# Patient Record
Sex: FEMALE | Race: WHITE | Employment: UNEMPLOYED | URBAN - METROPOLITAN AREA
[De-identification: names, ages, dates, MRNs, and addresses within clinical notes are randomized per-mention and may not be internally consistent; named-entity substitution may affect disease eponyms.]

---

## 2017-10-24 ENCOUNTER — TELEPHONE (OUTPATIENT)
Dept: FAMILY MEDICINE CLINIC | Facility: CLINIC | Age: 62
End: 2017-10-24

## 2017-10-24 DIAGNOSIS — Z12.31 VISIT FOR SCREENING MAMMOGRAM: Primary | ICD-10-CM

## 2017-10-24 NOTE — TELEPHONE ENCOUNTER
Pt called in requesting an order for a mammogram.  Pt would like a call back once entered onto her chart. Pt would like to get her mammo done this week.

## 2017-10-31 ENCOUNTER — OFFICE VISIT (OUTPATIENT)
Dept: FAMILY MEDICINE CLINIC | Facility: CLINIC | Age: 62
End: 2017-10-31

## 2017-10-31 VITALS
WEIGHT: 115 LBS | BODY MASS INDEX: 19.16 KG/M2 | DIASTOLIC BLOOD PRESSURE: 84 MMHG | TEMPERATURE: 98 F | HEIGHT: 65 IN | HEART RATE: 68 BPM | SYSTOLIC BLOOD PRESSURE: 132 MMHG

## 2017-10-31 DIAGNOSIS — Z12.11 SCREENING FOR COLON CANCER: ICD-10-CM

## 2017-10-31 DIAGNOSIS — Z78.0 MENOPAUSE: ICD-10-CM

## 2017-10-31 DIAGNOSIS — Z90.722 S/P TAH-BSO (TOTAL ABDOMINAL HYSTERECTOMY AND BILATERAL SALPINGO-OOPHORECTOMY): ICD-10-CM

## 2017-10-31 DIAGNOSIS — Z23 NEED FOR VACCINATION: ICD-10-CM

## 2017-10-31 DIAGNOSIS — Z90.79 S/P TAH-BSO (TOTAL ABDOMINAL HYSTERECTOMY AND BILATERAL SALPINGO-OOPHORECTOMY): ICD-10-CM

## 2017-10-31 DIAGNOSIS — J44.9 CHRONIC OBSTRUCTIVE PULMONARY DISEASE, UNSPECIFIED COPD TYPE (HCC): ICD-10-CM

## 2017-10-31 DIAGNOSIS — Z12.31 VISIT FOR SCREENING MAMMOGRAM: ICD-10-CM

## 2017-10-31 DIAGNOSIS — Z00.00 ADULT GENERAL MEDICAL EXAM: Primary | ICD-10-CM

## 2017-10-31 DIAGNOSIS — Z90.710 S/P TAH-BSO (TOTAL ABDOMINAL HYSTERECTOMY AND BILATERAL SALPINGO-OOPHORECTOMY): ICD-10-CM

## 2017-10-31 PROCEDURE — 99396 PREV VISIT EST AGE 40-64: CPT | Performed by: FAMILY MEDICINE

## 2017-10-31 PROCEDURE — 90686 IIV4 VACC NO PRSV 0.5 ML IM: CPT | Performed by: FAMILY MEDICINE

## 2017-10-31 PROCEDURE — 99212 OFFICE O/P EST SF 10 MIN: CPT | Performed by: FAMILY MEDICINE

## 2017-10-31 PROCEDURE — 90471 IMMUNIZATION ADMIN: CPT | Performed by: FAMILY MEDICINE

## 2017-10-31 NOTE — PROGRESS NOTES
Patient ID: Misa Pritchett is a 65year old female. HPI  Patient presents with:  Physical    She had labs done July 2017 through work at [employer].   She had an LDL of 106, total cholesterol 187, triglycerides 78, HDL 65, and a cholesterol/HDL ratio trouble swallowing. Eyes: Negative for visual disturbance. Respiratory: Negative for cough and shortness of breath. Cardiovascular: Negative for chest pain and palpitations.    Gastrointestinal: Negative for abdominal pain, blood in stool and vomiti mouth. Disp:  Rfl:      Allergies:No Known Allergies   PHYSICAL EXAM:   Physical Exam  Physical Exam   Constitutional: . She appears well-developed and well-nourished. No distress. HENT:   Head: Normocephalic.    Right Ear: Tympanic membrane and ear canal do that along with the mammogram.  Visit for screening mammogram  -     [facility] SCREENING BILAT (CPT=77067);  Future    Need for vaccination  -     IMMUNIZATION ADMINISTRATION  -     EACH ADDITIONAL VACCINE  -     TETANUS, DIPHTHERIA TOXOIDS AND ACELLULAR PERTU

## 2017-11-20 ENCOUNTER — APPOINTMENT (OUTPATIENT)
Dept: LAB | Age: 62
End: 2017-11-20
Attending: FAMILY MEDICINE
Payer: COMMERCIAL

## 2017-11-20 DIAGNOSIS — Z12.11 SCREENING FOR COLON CANCER: ICD-10-CM

## 2017-11-20 PROCEDURE — 82274 ASSAY TEST FOR BLOOD FECAL: CPT

## 2017-11-21 ENCOUNTER — HOSPITAL ENCOUNTER (OUTPATIENT)
Dept: BONE DENSITY | Age: 62
Discharge: HOME | End: 2017-11-21
Attending: FAMILY MEDICINE
Payer: COMMERCIAL

## 2017-11-21 ENCOUNTER — HOSPITAL ENCOUNTER (OUTPATIENT)
Dept: MAMMOGRAPHY | Age: 62
Discharge: HOME | End: 2017-11-21
Attending: FAMILY MEDICINE
Payer: COMMERCIAL

## 2017-11-21 DIAGNOSIS — Z12.31 VISIT FOR SCREENING MAMMOGRAM: ICD-10-CM

## 2017-11-21 DIAGNOSIS — Z78.0 MENOPAUSE: ICD-10-CM

## 2017-11-21 PROCEDURE — 77067 SCR MAMMO BI INCL CAD: CPT | Performed by: FAMILY MEDICINE

## 2017-11-21 PROCEDURE — 77080 DXA BONE DENSITY AXIAL: CPT | Performed by: FAMILY MEDICINE

## 2017-11-22 ENCOUNTER — TELEPHONE (OUTPATIENT)
Dept: FAMILY MEDICINE CLINIC | Facility: CLINIC | Age: 62
End: 2017-11-22

## 2017-11-22 NOTE — TELEPHONE ENCOUNTER
----- Message from Timothy Morgan DO sent at 11/22/2017 11:49 AM CST -----  Nurse, please document and health maintenance that she had this past for colon cancer screening. Your fecal occult blood test was negative.   Hopefully you can get a colonoscopy on

## 2017-11-24 ENCOUNTER — TELEPHONE (OUTPATIENT)
Dept: OTHER | Age: 62
End: 2017-11-24

## 2017-11-24 NOTE — TELEPHONE ENCOUNTER
LMTCB. Transfer to [id number].    ----- Message from Liborio Barrera DO sent at 11/23/2017 11:31 AM CST -----  Your DEXA scan shows osteoporosis. Make sure you stay on calcium and vitamin D and we will send in Fosamax that you can take on a weekly basis.   This

## 2017-11-29 NOTE — TELEPHONE ENCOUNTER
pt was inform of your message below she has 2 questions    1. Can she continue to take her Calcium 500+D3 that she takes, 1 before breakfast and 1 at dinner and can she also take her woman's multivitamins? 2. She is also requesting a note for work

## 2017-12-07 NOTE — TELEPHONE ENCOUNTER
Spoke with patient informed that note is ready and will mail a copy to her home address, verified home address.

## 2017-12-12 NOTE — TELEPHONE ENCOUNTER
Pt stated that she has not received the letter. Pt asked that a send it again. Letter has been resend. Pt does not have a printer.

## 2018-06-04 ENCOUNTER — TELEPHONE (OUTPATIENT)
Dept: FAMILY MEDICINE CLINIC | Facility: CLINIC | Age: 63
End: 2018-06-04

## 2018-06-04 NOTE — TELEPHONE ENCOUNTER
I do not understand why she needs to be seen either. If she is doing well I would to see her in October 2018 which would be her year for physical anyway.

## 2018-07-13 ENCOUNTER — TELEPHONE (OUTPATIENT)
Dept: FAMILY MEDICINE CLINIC | Facility: CLINIC | Age: 63
End: 2018-07-13

## 2018-07-13 DIAGNOSIS — E78.2 MIXED HYPERLIPIDEMIA: Primary | ICD-10-CM

## 2018-07-13 NOTE — TELEPHONE ENCOUNTER
Patient needs Bio Metric screening done, where has BP checked, labs done and glucose, requesting if can have this done in the office with nurse or if will need appointment.        Please advise

## 2018-07-20 NOTE — TELEPHONE ENCOUNTER
Patient has made appointment but needs orders for Glucose and cholesterol testing.        Please advise Patient wants lab draw prior to appointment and stated per insurance this is not a physical

## 2018-07-23 NOTE — TELEPHONE ENCOUNTER
Cholesterol and glucose have been ordered. Do them fasting for 10 hours and then I need to see her back and we can fill out the form together.

## 2018-07-23 NOTE — TELEPHONE ENCOUNTER
We last saw her in October of last year. That is when she usually does her physical.  Is her work requiring a biometric screening at this time instead? If so does she needs a particular set of labs, and does she go to [facility] or our lab?

## 2018-07-23 NOTE — TELEPHONE ENCOUNTER
Orders are for biometric screening, has forms that must be completed with lipids,sugars, ect,  patient must have this done by august 31st.

## 2018-07-24 ENCOUNTER — APPOINTMENT (OUTPATIENT)
Dept: LAB | Age: 63
End: 2018-07-24
Attending: FAMILY MEDICINE
Payer: COMMERCIAL

## 2018-07-24 ENCOUNTER — OFFICE VISIT (OUTPATIENT)
Dept: FAMILY MEDICINE CLINIC | Facility: CLINIC | Age: 63
End: 2018-07-24
Payer: COMMERCIAL

## 2018-07-24 VITALS
SYSTOLIC BLOOD PRESSURE: 114 MMHG | TEMPERATURE: 99 F | HEIGHT: 65 IN | DIASTOLIC BLOOD PRESSURE: 76 MMHG | HEART RATE: 64 BPM | BODY MASS INDEX: 19.66 KG/M2 | WEIGHT: 118 LBS

## 2018-07-24 DIAGNOSIS — E78.2 MIXED HYPERLIPIDEMIA: Primary | ICD-10-CM

## 2018-07-24 DIAGNOSIS — Z00.8 ENCOUNTER FOR BIOMETRIC SCREENING: ICD-10-CM

## 2018-07-24 DIAGNOSIS — Z23 NEED FOR VACCINATION: ICD-10-CM

## 2018-07-24 DIAGNOSIS — E78.2 MIXED HYPERLIPIDEMIA: ICD-10-CM

## 2018-07-24 LAB
CHOLEST SERPL-MCNC: 197 MG/DL (ref 110–200)
GLUCOSE SERPL-MCNC: 88 MG/DL (ref 70–99)
HDLC SERPL-MCNC: 63 MG/DL
LDLC SERPL CALC-MCNC: 117 MG/DL (ref 0–99)
NONHDLC SERPL-MCNC: 134 MG/DL
TRIGL SERPL-MCNC: 86 MG/DL (ref 1–149)

## 2018-07-24 PROCEDURE — 80061 LIPID PANEL: CPT

## 2018-07-24 PROCEDURE — 82947 ASSAY GLUCOSE BLOOD QUANT: CPT

## 2018-07-24 PROCEDURE — 90715 TDAP VACCINE 7 YRS/> IM: CPT | Performed by: FAMILY MEDICINE

## 2018-07-24 PROCEDURE — 99212 OFFICE O/P EST SF 10 MIN: CPT | Performed by: FAMILY MEDICINE

## 2018-07-24 PROCEDURE — 36415 COLL VENOUS BLD VENIPUNCTURE: CPT

## 2018-07-24 PROCEDURE — 99214 OFFICE O/P EST MOD 30 MIN: CPT | Performed by: FAMILY MEDICINE

## 2018-07-24 PROCEDURE — 90471 IMMUNIZATION ADMIN: CPT | Performed by: FAMILY MEDICINE

## 2018-07-24 NOTE — PROGRESS NOTES
Patient ID: Vishal Ortega is a 59year old female. HPI  Patient presents with:  Forms Completion: biometric form filled out not a Px    She fasted for 12 hours and needs labs for a biometric screening. She works at [employer].   They do need a cholestero results found for: Alfonso Rust, CREAURINE, MIALBURINE, MCRRATIOUR, MALBCRECALC, MICROALBUMIN, CREAUR, MALBCREACALC    No results found for: CHOLEST, TRIG, HDL, LDL, VLDL, TCHDLRATIO, NONHDLC, CHOLHDLRATIO, NONHDLC, CALCNONHDL    TSH (S) (uIU/mL)   Date Va Sennosides-Docusate Sodium (SENNA S) 8.6-50 MG Oral Tab Take  by mouth.  Disp:  Rfl:      Allergies:No Known Allergies   PHYSICAL EXAM:   Physical Exam  Blood pressure 114/76, pulse 64, temperature 98.7 °F (37.1 °C), temperature source Oral, height 5' 5\"

## 2018-09-11 ENCOUNTER — TELEPHONE (OUTPATIENT)
Dept: FAMILY MEDICINE CLINIC | Facility: CLINIC | Age: 63
End: 2018-09-11

## 2018-09-11 NOTE — TELEPHONE ENCOUNTER
Dr Lelia Ricketts, please advise. Patient stated that she has been on the alendronate now almost a year, and has had symptoms with taking this medicine: headaches, arm and leg bones sore, nauseated, dizziness.  She stated she didn't have these symptoms before ta

## 2018-09-11 NOTE — TELEPHONE ENCOUNTER
Stop taking the medication and see how you feel. If you can definitely feel better without the aches and pains that would be wonderful but if you continue to have the aches and pains I definitely want to see you.   After being off the medication for 2 or 3

## 2018-11-16 NOTE — TELEPHONE ENCOUNTER
Diabetes/Glucose Control    • Glucose maintained within prescribed range Progressing        Impaired Communication    • Patient will achieve maximal communication potential Progressing        Impaired Functional Mobility    • Achieve highest/safest level o Yes, she can do that with regard to the calcium taking the supplement twice daily along with a multivitamin. And because she does have osteopenia we will go ahead and write down a letter for work.

## 2020-02-18 ENCOUNTER — OFFICE VISIT (OUTPATIENT)
Dept: FAMILY MEDICINE CLINIC | Facility: CLINIC | Age: 65
End: 2020-02-18
Payer: COMMERCIAL

## 2020-02-18 VITALS
HEIGHT: 65 IN | RESPIRATION RATE: 17 BRPM | TEMPERATURE: 99 F | HEART RATE: 70 BPM | DIASTOLIC BLOOD PRESSURE: 81 MMHG | BODY MASS INDEX: 18.83 KG/M2 | WEIGHT: 113 LBS | SYSTOLIC BLOOD PRESSURE: 146 MMHG

## 2020-02-18 DIAGNOSIS — I83.90 SPIDER VEIN OF LOWER EXTREMITY: ICD-10-CM

## 2020-02-18 DIAGNOSIS — R29.3 POOR POSTURE: ICD-10-CM

## 2020-02-18 DIAGNOSIS — K08.9 POOR DENTITION: ICD-10-CM

## 2020-02-18 DIAGNOSIS — J44.9 CHRONIC OBSTRUCTIVE PULMONARY DISEASE, UNSPECIFIED COPD TYPE (HCC): ICD-10-CM

## 2020-02-18 DIAGNOSIS — Z00.00 ADULT GENERAL MEDICAL EXAM: Primary | ICD-10-CM

## 2020-02-18 DIAGNOSIS — E78.2 MIXED HYPERLIPIDEMIA: ICD-10-CM

## 2020-02-18 DIAGNOSIS — M81.0 AGE-RELATED OSTEOPOROSIS WITHOUT CURRENT PATHOLOGICAL FRACTURE: ICD-10-CM

## 2020-02-18 DIAGNOSIS — Z12.11 SCREENING FOR COLON CANCER: ICD-10-CM

## 2020-02-18 DIAGNOSIS — L98.9 SKIN LESION OF LEFT LEG: ICD-10-CM

## 2020-02-18 DIAGNOSIS — L60.8 DISCOLORATION AND THICKENING OF NAILS BOTH FEET: ICD-10-CM

## 2020-02-18 DIAGNOSIS — R03.0 ELEVATED BLOOD PRESSURE READING: ICD-10-CM

## 2020-02-18 DIAGNOSIS — Z12.31 VISIT FOR SCREENING MAMMOGRAM: ICD-10-CM

## 2020-02-18 DIAGNOSIS — L82.1 SK (SEBORRHEIC KERATOSIS): ICD-10-CM

## 2020-02-18 DIAGNOSIS — Z12.4 CERVICAL CANCER SCREENING: ICD-10-CM

## 2020-02-18 DIAGNOSIS — L21.9 SEBORRHEIC DERMATITIS OF SCALP: ICD-10-CM

## 2020-02-18 PROCEDURE — 99396 PREV VISIT EST AGE 40-64: CPT | Performed by: FAMILY MEDICINE

## 2020-02-18 PROCEDURE — 99214 OFFICE O/P EST MOD 30 MIN: CPT | Performed by: FAMILY MEDICINE

## 2020-02-18 RX ORDER — KETOCONAZOLE 20 MG/ML
SHAMPOO TOPICAL
Qty: 120 ML | Refills: 2 | Status: SHIPPED | OUTPATIENT
Start: 2020-02-18

## 2020-02-18 NOTE — PROGRESS NOTES
Patient ID: Marija Denny is a 62year old female. HPI  Patient presents with:  Physical    Pt does not smoke. She works at [employer] in [employer]. States she is  in name only. She lives in the same house as her .  She rarely drinks alcohol spend a whole afternoon in the bathroom emptying her bowels to prep for colonoscopy. She would be willing to do a fit test.    States her teeth are in bad shape. She has not seen a dentist for a long time. She has bleeding gums and missing teeth.      She OSMOCALC 290 09/23/2013    AST 25 09/23/2013    ALT 17 09/23/2013    ALKPHOS 60 09/23/2013    BILT 0.6 09/23/2013    TP 7.3 09/23/2013    ALB 3.8 09/23/2013    GLOBULIN 3.5 09/23/2013    AGRATIO 1.1 09/23/2013     09/23/2013    K 4.0 09/23/2013    CL Positive for abdominal pain. Negative for blood in stool. Genitourinary: Negative for hematuria. Skin: Positive for rash (scalp, back, legs). Negative for color change. Allergic/Immunologic: Negative for environmental allergies.    Neurological: Negat Attends meetings of clubs or organizations: Not on file        Relationship status: Not on file      Intimate partner violence:        Fear of current or ex partner: Not on file        Emotionally abused: Not on file        Physically abused: Not on file normal. There is no hepatosplenomegaly. There is no tenderness. Lymphadenopathy: She has no cervical adenopathy. Lower legs: No edema of the legs bilaterally. Good PT pulses. Neurological: She is alert and oriented to person, place, and time.  She has (CPT=77080); Future    Cervical cancer screening  -     OBG - INTERNAL    Screening for colon cancer  -     OCCULT BLOOD, FECAL, IMMUNOASSAY;  Future    Elevated blood pressure reading  -     URINALYSIS WITH CULTURE REFLEX; Future    Poor dentition  See Guanakito Catalan prepared under the direction and in the presence of Ramesh Moss DO. Electronically Signed: Nessa Howell, 2/18/2020, 11:12 AM.    IRamesh DO,  personally performed the services described in this documentation.  All medical record entries ma

## 2020-03-02 ENCOUNTER — OFFICE VISIT (OUTPATIENT)
Dept: PODIATRY CLINIC | Facility: CLINIC | Age: 65
End: 2020-03-02
Payer: COMMERCIAL

## 2020-03-02 DIAGNOSIS — L60.3 NAIL DYSTROPHY: Primary | ICD-10-CM

## 2020-03-02 PROCEDURE — 99202 OFFICE O/P NEW SF 15 MIN: CPT | Performed by: PODIATRIST

## 2020-03-02 PROCEDURE — 99212 OFFICE O/P EST SF 10 MIN: CPT | Performed by: PODIATRIST

## 2020-03-02 NOTE — PROGRESS NOTES
HPI:    Patient ID: Trinh Stevens is a 62year old female. This pleasant 29-year-old female presents as a new patient to me on consult from [address]. Patient's concern is changes associated with both of her great toenails.   She is noticed a change o instructed to follow-up at this point I recommend no treatment but be patient and wait as the nail grows out. I discussed appropriate clipping filing and brushing of this nail.   She is well-informed and will follow-up as needed         ASSESSMENT/PLAN:

## 2020-03-03 ENCOUNTER — HOSPITAL ENCOUNTER (OUTPATIENT)
Dept: GENERAL RADIOLOGY | Age: 65
Discharge: HOME | End: 2020-03-03
Attending: FAMILY MEDICINE
Payer: COMMERCIAL

## 2020-03-03 ENCOUNTER — LAB ENCOUNTER (OUTPATIENT)
Dept: LAB | Age: 65
End: 2020-03-03
Attending: FAMILY MEDICINE
Payer: COMMERCIAL

## 2020-03-03 ENCOUNTER — APPOINTMENT (OUTPATIENT)
Dept: LAB | Age: 65
End: 2020-03-03
Attending: FAMILY MEDICINE
Payer: COMMERCIAL

## 2020-03-03 DIAGNOSIS — E78.2 MIXED HYPERLIPIDEMIA: ICD-10-CM

## 2020-03-03 DIAGNOSIS — R03.0 ELEVATED BLOOD PRESSURE READING: ICD-10-CM

## 2020-03-03 DIAGNOSIS — Z00.00 ADULT GENERAL MEDICAL EXAM: ICD-10-CM

## 2020-03-03 DIAGNOSIS — J44.9 CHRONIC OBSTRUCTIVE PULMONARY DISEASE, UNSPECIFIED COPD TYPE (HCC): ICD-10-CM

## 2020-03-03 LAB
ALBUMIN SERPL-MCNC: 3.9 G/DL (ref 3.4–5)
ALBUMIN/GLOB SERPL: 1 {RATIO} (ref 1–2)
ALP LIVER SERPL-CCNC: 69 U/L (ref 50–130)
ALT SERPL-CCNC: 20 U/L (ref 13–56)
ANION GAP SERPL CALC-SCNC: 5 MMOL/L (ref 0–18)
AST SERPL-CCNC: 19 U/L (ref 15–37)
BASOPHILS # BLD AUTO: 0.06 X10(3) UL (ref 0–0.2)
BASOPHILS NFR BLD AUTO: 0.9 %
BILIRUB SERPL-MCNC: 0.5 MG/DL (ref 0.1–2)
BILIRUB UR QL: NEGATIVE
BUN BLD-MCNC: 28 MG/DL (ref 7–18)
BUN/CREAT SERPL: 21.9 (ref 10–20)
CALCIUM BLD-MCNC: 9.4 MG/DL (ref 8.5–10.1)
CHLORIDE SERPL-SCNC: 103 MMOL/L (ref 98–112)
CHOLEST SMN-MCNC: 181 MG/DL (ref ?–200)
CLARITY UR: CLEAR
CO2 SERPL-SCNC: 28 MMOL/L (ref 21–32)
COLOR UR: YELLOW
CREAT BLD-MCNC: 1.28 MG/DL (ref 0.55–1.02)
DEPRECATED RDW RBC AUTO: 43.5 FL (ref 35.1–46.3)
EOSINOPHIL # BLD AUTO: 0.13 X10(3) UL (ref 0–0.7)
EOSINOPHIL NFR BLD AUTO: 1.8 %
ERYTHROCYTE [DISTWIDTH] IN BLOOD BY AUTOMATED COUNT: 12.5 % (ref 11–15)
GLOBULIN PLAS-MCNC: 3.9 G/DL (ref 2.8–4.4)
GLUCOSE BLD-MCNC: 85 MG/DL (ref 70–99)
GLUCOSE UR-MCNC: NEGATIVE MG/DL
HCT VFR BLD AUTO: 45.2 % (ref 35–48)
HDLC SERPL-MCNC: 61 MG/DL (ref 40–59)
HGB BLD-MCNC: 14.2 G/DL (ref 12–16)
HGB UR QL STRIP.AUTO: NEGATIVE
IMM GRANULOCYTES # BLD AUTO: 0.02 X10(3) UL (ref 0–1)
IMM GRANULOCYTES NFR BLD: 0.3 %
KETONES UR-MCNC: NEGATIVE MG/DL
LDLC SERPL CALC-MCNC: 104 MG/DL (ref ?–100)
LEUKOCYTE ESTERASE UR QL STRIP.AUTO: NEGATIVE
LYMPHOCYTES # BLD AUTO: 1.16 X10(3) UL (ref 1–4)
LYMPHOCYTES NFR BLD AUTO: 16.5 %
M PROTEIN MFR SERPL ELPH: 7.8 G/DL (ref 6.4–8.2)
MCH RBC QN AUTO: 29.8 PG (ref 26–34)
MCHC RBC AUTO-ENTMCNC: 31.4 G/DL (ref 31–37)
MCV RBC AUTO: 94.8 FL (ref 80–100)
MONOCYTES # BLD AUTO: 0.45 X10(3) UL (ref 0.1–1)
MONOCYTES NFR BLD AUTO: 6.4 %
NEUTROPHILS # BLD AUTO: 5.21 X10 (3) UL (ref 1.5–7.7)
NEUTROPHILS # BLD AUTO: 5.21 X10(3) UL (ref 1.5–7.7)
NEUTROPHILS NFR BLD AUTO: 74.1 %
NITRITE UR QL STRIP.AUTO: NEGATIVE
NONHDLC SERPL-MCNC: 120 MG/DL (ref ?–130)
OSMOLALITY SERPL CALC.SUM OF ELEC: 287 MOSM/KG (ref 275–295)
PATIENT FASTING Y/N/NP: YES
PATIENT FASTING Y/N/NP: YES
PH UR: 7 [PH] (ref 5–8)
PLATELET # BLD AUTO: 269 10(3)UL (ref 150–450)
POTASSIUM SERPL-SCNC: 4.1 MMOL/L (ref 3.5–5.1)
PROT UR-MCNC: NEGATIVE MG/DL
RBC # BLD AUTO: 4.77 X10(6)UL (ref 3.8–5.3)
SODIUM SERPL-SCNC: 136 MMOL/L (ref 136–145)
SP GR UR STRIP: 1.02 (ref 1–1.03)
TRIGL SERPL-MCNC: 80 MG/DL (ref 30–149)
TSI SER-ACNC: 2.06 MIU/ML (ref 0.36–3.74)
UROBILINOGEN UR STRIP-ACNC: <2
VLDLC SERPL CALC-MCNC: 16 MG/DL (ref 0–30)
WBC # BLD AUTO: 7 X10(3) UL (ref 4–11)

## 2020-03-03 PROCEDURE — 85025 COMPLETE CBC W/AUTO DIFF WBC: CPT

## 2020-03-03 PROCEDURE — 36415 COLL VENOUS BLD VENIPUNCTURE: CPT

## 2020-03-03 PROCEDURE — 71046 X-RAY EXAM CHEST 2 VIEWS: CPT | Performed by: FAMILY MEDICINE

## 2020-03-03 PROCEDURE — 82306 VITAMIN D 25 HYDROXY: CPT

## 2020-03-03 PROCEDURE — 80061 LIPID PANEL: CPT

## 2020-03-03 PROCEDURE — 80053 COMPREHEN METABOLIC PANEL: CPT

## 2020-03-03 PROCEDURE — 81003 URINALYSIS AUTO W/O SCOPE: CPT

## 2020-03-03 PROCEDURE — 93010 ELECTROCARDIOGRAM REPORT: CPT | Performed by: FAMILY MEDICINE

## 2020-03-03 PROCEDURE — 93005 ELECTROCARDIOGRAM TRACING: CPT

## 2020-03-03 PROCEDURE — 84443 ASSAY THYROID STIM HORMONE: CPT

## 2020-03-04 LAB — 25(OH)D3 SERPL-MCNC: 69.6 NG/ML (ref 30–100)

## 2020-03-09 ENCOUNTER — APPOINTMENT (OUTPATIENT)
Dept: LAB | Age: 65
End: 2020-03-09
Attending: FAMILY MEDICINE
Payer: COMMERCIAL

## 2020-03-09 DIAGNOSIS — Z12.11 SCREENING FOR COLON CANCER: ICD-10-CM

## 2020-03-09 PROCEDURE — 82274 ASSAY TEST FOR BLOOD FECAL: CPT

## 2020-03-10 ENCOUNTER — APPOINTMENT (OUTPATIENT)
Dept: LAB | Age: 65
End: 2020-03-10
Attending: FAMILY MEDICINE
Payer: COMMERCIAL

## 2020-03-10 DIAGNOSIS — N28.9 RENAL INSUFFICIENCY: ICD-10-CM

## 2020-03-10 LAB
ANION GAP SERPL CALC-SCNC: 7 MMOL/L (ref 0–18)
BUN BLD-MCNC: 20 MG/DL (ref 7–18)
BUN/CREAT SERPL: 21.5 (ref 10–20)
CALCIUM BLD-MCNC: 10.4 MG/DL (ref 8.5–10.1)
CHLORIDE SERPL-SCNC: 103 MMOL/L (ref 98–112)
CO2 SERPL-SCNC: 29 MMOL/L (ref 21–32)
CREAT BLD-MCNC: 0.93 MG/DL (ref 0.55–1.02)
GLUCOSE BLD-MCNC: 87 MG/DL (ref 70–99)
HEMOCCULT STL QL: NEGATIVE
OSMOLALITY SERPL CALC.SUM OF ELEC: 290 MOSM/KG (ref 275–295)
PATIENT FASTING Y/N/NP: NO
POTASSIUM SERPL-SCNC: 4.1 MMOL/L (ref 3.5–5.1)
SODIUM SERPL-SCNC: 139 MMOL/L (ref 136–145)

## 2020-03-10 PROCEDURE — 36415 COLL VENOUS BLD VENIPUNCTURE: CPT

## 2020-03-10 PROCEDURE — 80048 BASIC METABOLIC PNL TOTAL CA: CPT

## 2020-03-16 ENCOUNTER — HOSPITAL ENCOUNTER (OUTPATIENT)
Dept: MAMMOGRAPHY | Age: 65
Discharge: HOME | End: 2020-03-16
Attending: FAMILY MEDICINE
Payer: COMMERCIAL

## 2020-03-16 DIAGNOSIS — Z12.31 VISIT FOR SCREENING MAMMOGRAM: ICD-10-CM

## 2020-03-16 PROCEDURE — 77063 BREAST TOMOSYNTHESIS BI: CPT | Performed by: FAMILY MEDICINE

## 2020-03-16 PROCEDURE — 77067 SCR MAMMO BI INCL CAD: CPT | Performed by: FAMILY MEDICINE

## 2020-06-09 ENCOUNTER — TELEPHONE (OUTPATIENT)
Dept: FAMILY MEDICINE CLINIC | Facility: CLINIC | Age: 65
End: 2020-06-09

## 2020-06-09 NOTE — TELEPHONE ENCOUNTER
Per patient she has an appointment on 06/16/2020 for her Dexa scan and need to know if any of her medication need to stop? Please advise.

## 2020-06-16 ENCOUNTER — HOSPITAL ENCOUNTER (OUTPATIENT)
Dept: BONE DENSITY | Age: 65
Discharge: HOME | End: 2020-06-16
Attending: FAMILY MEDICINE
Payer: COMMERCIAL

## 2020-06-16 DIAGNOSIS — M81.0 AGE-RELATED OSTEOPOROSIS WITHOUT CURRENT PATHOLOGICAL FRACTURE: ICD-10-CM

## 2020-06-16 PROCEDURE — 77080 DXA BONE DENSITY AXIAL: CPT | Performed by: FAMILY MEDICINE

## 2020-07-10 ENCOUNTER — VIRTUAL PHONE E/M (OUTPATIENT)
Dept: FAMILY MEDICINE CLINIC | Facility: CLINIC | Age: 65
End: 2020-07-10
Payer: COMMERCIAL

## 2020-07-10 DIAGNOSIS — M81.0 AGE-RELATED OSTEOPOROSIS WITHOUT CURRENT PATHOLOGICAL FRACTURE: Primary | ICD-10-CM

## 2020-07-10 PROCEDURE — 99213 OFFICE O/P EST LOW 20 MIN: CPT | Performed by: FAMILY MEDICINE

## 2020-07-10 RX ORDER — RISEDRONATE SODIUM 150 MG/1
150 TABLET, FILM COATED ORAL
Qty: 3 TABLET | Refills: 0 | Status: SHIPPED | OUTPATIENT
Start: 2020-07-10 | End: 2020-08-13

## 2020-07-10 NOTE — PROGRESS NOTES
TELEPHONE VISIT PROGRESS NOTE  Todays date: 7/10/2020 1:49 PM      Due to the COVID-19 emergency implementation plan, this patient's incoming call was converted to a telephone visit as agreed upon with the patient.     Virtual/Telephone Check-In    Vincent Marshall discussed her options for plan of care with the pt. She has not tried [place] or Actonel. I reinforced the importance of osteoporosis management. She takes a colon and bone supplement from [employer] twice per day.  She states she tries to be very cautious with unchanged and the AP lumbar has gone from 0.835 to 0.831 a loss of 0.5 %. 10 year Fracture Risk:      Major Osteoporotic Fracture:  12 %. Hip Fracture:  2.8 %.       Physical Exam:   Limited examination due to this being a telephone visit       Pa Leukemia   • Heart Disease Paternal Uncle         CAD       Reviewed Social History:  Social History    Tobacco Use      Smoking status: Former Smoker        Types: Cigarettes      Smokeless tobacco: Former User    Alcohol use:  Yes      Alcohol/week: 0.0 s

## 2020-08-13 ENCOUNTER — OFFICE VISIT (OUTPATIENT)
Dept: OBGYN CLINIC | Facility: CLINIC | Age: 65
End: 2020-08-13

## 2020-08-13 VITALS
DIASTOLIC BLOOD PRESSURE: 79 MMHG | HEART RATE: 77 BPM | BODY MASS INDEX: 19 KG/M2 | WEIGHT: 111.63 LBS | SYSTOLIC BLOOD PRESSURE: 121 MMHG

## 2020-08-13 DIAGNOSIS — Z01.419 ENCOUNTER FOR GYNECOLOGICAL EXAMINATION WITHOUT ABNORMAL FINDING: Primary | ICD-10-CM

## 2020-08-13 PROCEDURE — 99396 PREV VISIT EST AGE 40-64: CPT | Performed by: OBSTETRICS & GYNECOLOGY

## 2020-08-13 PROCEDURE — 3074F SYST BP LT 130 MM HG: CPT | Performed by: OBSTETRICS & GYNECOLOGY

## 2020-08-13 PROCEDURE — 3078F DIAST BP <80 MM HG: CPT | Performed by: OBSTETRICS & GYNECOLOGY

## 2020-08-16 PROBLEM — Z90.710 HISTORY OF TOTAL VAGINAL HYSTERECTOMY: Status: ACTIVE | Noted: 2017-10-31

## 2020-08-16 NOTE — PROGRESS NOTES
Jem Carmona is a 60year old female  No LMP recorded. Patient has had a hysterectomy. Patient presents with:  Gyn Exam: Annual exam -- last seen in . Hx TVH/BSO in past. Just lost job at [employer] (worked there 40 yrs)  .     OBSTETRICS HISTORY: per session: Not on file      Stress: Not on file    Relationships      Social connections:        Talks on phone: Not on file        Gets together: Not on file        Attends Denominational service: Not on file        Active member of club or organization: Not denies chest pain or palpitations  Respiratory:    denies shortness of breath  Gastrointestinal:  denies severe abdominal pain, frequent diarrhea or constipation, nausea / vomiting  Genitourinary:    denies dysuria, bothersome incontinence  Skin/Breast:

## 2020-08-20 ENCOUNTER — NURSE TRIAGE (OUTPATIENT)
Dept: FAMILY MEDICINE CLINIC | Facility: CLINIC | Age: 65
End: 2020-08-20

## 2020-08-20 NOTE — TELEPHONE ENCOUNTER
Action Requested: Summary for Provider     []  Critical Lab, Recommendations Needed  [] Need Additional Advice  []   FYI    []   Need Orders  [] Need Medications Sent to Pharmacy  []  Other     SUMMARY:    An appointment was offered and refused.   She state

## 2020-08-21 RX ORDER — SULFAMETHOXAZOLE AND TRIMETHOPRIM 800; 160 MG/1; MG/1
1 TABLET ORAL 2 TIMES DAILY
Qty: 10 TABLET | Refills: 0 | Status: SHIPPED | OUTPATIENT
Start: 2020-08-21 | End: 2020-08-26

## 2020-08-21 NOTE — TELEPHONE ENCOUNTER
I will go ahead and do this for her this time but next time she would still need to be seen even without insurance or go to the immediate care. .  I sent in Bactrim twice daily for 5 days.

## 2020-08-21 NOTE — TELEPHONE ENCOUNTER
Left message to pt to call back. Also Vengahart message with MD recommendation sent to pt. No future appointments.

## 2020-08-21 NOTE — TELEPHONE ENCOUNTER
Pt calling to follow up. Reviewed doctor's instructions as noted below. Pt agreed with plan of care, had no further questions at this time.

## 2020-08-22 ENCOUNTER — TELEPHONE (OUTPATIENT)
Dept: FAMILY MEDICINE CLINIC | Facility: CLINIC | Age: 65
End: 2020-08-22

## 2020-08-22 NOTE — TELEPHONE ENCOUNTER
On call note: Was called on 8/22/20 by patient that she has been taking bactrim for UTI symptoms. Those symptoms have resolved. No hematuria or urgency. Pt now has had some loss of appetite and headaches. Thinks this is from the antibiotics.  Pt denies any

## 2020-08-24 ENCOUNTER — TELEPHONE (OUTPATIENT)
Dept: FAMILY MEDICINE CLINIC | Facility: CLINIC | Age: 65
End: 2020-08-24

## 2020-08-24 NOTE — TELEPHONE ENCOUNTER
Pt states on Friday, 8/21/2020, she was given [facility] prescription for symptoms of a urinary tract infection. Pt states she had taken three doses of the medication and felt she was having side effects from that med.  She talked to the on-call doctor about

## 2020-08-25 ENCOUNTER — HOSPITAL ENCOUNTER (OUTPATIENT)
Age: 65
Discharge: HOME | End: 2020-08-25
Attending: EMERGENCY MEDICINE

## 2020-08-25 VITALS
SYSTOLIC BLOOD PRESSURE: 129 MMHG | DIASTOLIC BLOOD PRESSURE: 98 MMHG | WEIGHT: 111 LBS | HEIGHT: 64 IN | TEMPERATURE: 98 F | HEART RATE: 79 BPM | BODY MASS INDEX: 18.95 KG/M2 | OXYGEN SATURATION: 99 % | RESPIRATION RATE: 16 BRPM

## 2020-08-25 DIAGNOSIS — R10.9 ABDOMINAL PAIN, ACUTE: ICD-10-CM

## 2020-08-25 DIAGNOSIS — Z87.440 HX: UTI (URINARY TRACT INFECTION): ICD-10-CM

## 2020-08-25 DIAGNOSIS — B34.9 VIRAL SYNDROME: Primary | ICD-10-CM

## 2020-08-25 DIAGNOSIS — Z20.822 ENCOUNTER FOR LABORATORY TESTING FOR COVID-19 VIRUS: ICD-10-CM

## 2020-08-25 PROCEDURE — 87086 URINE CULTURE/COLONY COUNT: CPT | Performed by: EMERGENCY MEDICINE

## 2020-08-25 PROCEDURE — 81002 URINALYSIS NONAUTO W/O SCOPE: CPT | Performed by: EMERGENCY MEDICINE

## 2020-08-25 PROCEDURE — 99214 OFFICE O/P EST MOD 30 MIN: CPT | Performed by: EMERGENCY MEDICINE

## 2020-08-25 PROCEDURE — U0003 INFECTIOUS AGENT DETECTION BY NUCLEIC ACID (DNA OR RNA); SEVERE ACUTE RESPIRATORY SYNDROME CORONAVIRUS 2 (SARS-COV-2) (CORONAVIRUS DISEASE [COVID-19]), AMPLIFIED PROBE TECHNIQUE, MAKING USE OF HIGH THROUGHPUT TECHNOLOGIES AS DESCRIBED BY CMS-2020-01-R: HCPCS | Performed by: EMERGENCY MEDICINE

## 2020-08-25 NOTE — ED INITIAL ASSESSMENT (HPI)
C/o nausea no vomiting headache loss of appetite feeling tired  Denies fever  Was started on Bactrim 4 days ago  Pt also c/o abd pain

## 2020-08-25 NOTE — TELEPHONE ENCOUNTER
I agree with triage advice given. If her symptoms continue like this is going to need to go to the immediate care or emergency room. Noris Brown

## 2020-08-25 NOTE — ED PROVIDER NOTES
Patient Seen in: [facility] In [address] [place] [address]      History   Patient presents with:  Nausea/Vomiting/Diarrhea  Headache    Stated Complaint: nausea, headaches, low appetite    HPI  Patient is here with her son.   Patient has a variety of c signs reviewed. All other systems reviewed and negative except as noted above.     Physical Exam     ED Triage Vitals [08/25/20 1011]   BP (!) 129/98   Pulse 79   Resp 16   Temp 97.7 °F (36.5 °C)   Temp src Temporal   SpO2 99 %   O2 Device None (Room a [facility] POCT URINALYSIS DIPSTICK - Abnormal; Notable for the following components:       Result Value    Ketone, Urine 15  (*)     All other components within normal limits   SARS-COV-2 BY PCR ()   URINE CULTURE, ROUTINE       MDM   Patient is most conc

## 2021-04-05 ENCOUNTER — IMMUNIZATION (OUTPATIENT)
Dept: LAB | Age: 66
End: 2021-04-05
Attending: HOSPITALIST
Payer: COMMERCIAL

## 2021-04-05 DIAGNOSIS — Z23 NEED FOR VACCINATION: Primary | ICD-10-CM

## 2021-04-05 PROCEDURE — 0001A SARSCOV2 VAC 30MCG/0.3ML IM: CPT

## 2021-04-08 ENCOUNTER — NURSE TRIAGE (OUTPATIENT)
Dept: FAMILY MEDICINE CLINIC | Facility: CLINIC | Age: 66
End: 2021-04-08

## 2021-04-08 ENCOUNTER — OFFICE VISIT (OUTPATIENT)
Dept: FAMILY MEDICINE CLINIC | Facility: CLINIC | Age: 66
End: 2021-04-08

## 2021-04-08 VITALS
HEART RATE: 73 BPM | SYSTOLIC BLOOD PRESSURE: 118 MMHG | HEIGHT: 64 IN | WEIGHT: 110 LBS | DIASTOLIC BLOOD PRESSURE: 80 MMHG | BODY MASS INDEX: 18.78 KG/M2

## 2021-04-08 DIAGNOSIS — R51.9 HEADACHE DISORDER: Primary | ICD-10-CM

## 2021-04-08 PROCEDURE — 3079F DIAST BP 80-89 MM HG: CPT | Performed by: NURSE PRACTITIONER

## 2021-04-08 PROCEDURE — 99214 OFFICE O/P EST MOD 30 MIN: CPT | Performed by: NURSE PRACTITIONER

## 2021-04-08 PROCEDURE — 3074F SYST BP LT 130 MM HG: CPT | Performed by: NURSE PRACTITIONER

## 2021-04-08 PROCEDURE — 3008F BODY MASS INDEX DOCD: CPT | Performed by: NURSE PRACTITIONER

## 2021-04-08 RX ORDER — SUMATRIPTAN 25 MG/1
TABLET, FILM COATED ORAL
Qty: 9 TABLET | Refills: 0 | Status: SHIPPED | OUTPATIENT
Start: 2021-04-08

## 2021-04-08 RX ORDER — OMEPRAZOLE 20 MG/1
20 CAPSULE, DELAYED RELEASE ORAL
Qty: 30 CAPSULE | Refills: 0 | Status: SHIPPED | OUTPATIENT
Start: 2021-04-08

## 2021-04-08 RX ORDER — NAPROXEN 500 MG/1
500 TABLET ORAL 2 TIMES DAILY PRN
Qty: 60 TABLET | Refills: 0 | Status: SHIPPED | OUTPATIENT
Start: 2021-04-08

## 2021-04-08 NOTE — TELEPHONE ENCOUNTER
Reason for Disposition  • Unexplained headache that is present > 24 hours    Protocols used: HEADACHE-A-OH    Action Requested: Summary for Provider     []  Critical Lab, Recommendations Needed  [] Need Additional Advice  []   FYI    []   Need Orders  []

## 2021-04-08 NOTE — PROGRESS NOTES
HPI    Patient presents for intermittent headache x 3 days. Had headache on Monday and was scheduled to get covid 19 pfizer vaccine. Headache got a little better from advil so got vaccine. Had some headache, nausea, chills after vaccine.   Woke up yester Disease Father         CAD   • Cancer Maternal Uncle         Cancer - Leukemia   • Heart Disease Paternal Uncle         CAD       Social History    Socioeconomic History      Marital status:       Spouse name: Not on file      Number of children: No Club or Organization Meetings:       Marital Status:   Intimate Partner Violence:       Fear of Current or Ex-Partner:       Emotionally Abused:       Physically Abused:       Sexually Abused:     Current Outpatient Medications   Medication Sig Dispense Re generalized abdominal tenderness. There is no guarding or rebound. Hernia: No hernia is present. Neurological:      Mental Status: She is alert and oriented to person, place, and time.    Psychiatric:         Mood and Affect: Mood normal.         Beh

## 2021-04-08 NOTE — PATIENT INSTRUCTIONS
Self-Care for Headaches  Most headaches aren't serious and can be relieved with self-care. But some headaches may be a sign of another health problem like eye trouble or high blood pressure.  To find the best treatment, learn what kind of headaches you ge Headache after an activity such as driving or working on a computer  Signs of migraine  Any of the following can be signs:   · Throbbing pain on one or both sides of your head  · Nausea or vomiting  · Extreme sensitivity to light, sound, and smells  · Brig

## 2021-04-12 ENCOUNTER — TELEPHONE (OUTPATIENT)
Dept: FAMILY MEDICINE CLINIC | Facility: CLINIC | Age: 66
End: 2021-04-12

## 2021-04-12 NOTE — TELEPHONE ENCOUNTER
Pt stated since taking omeprazole started Friday til Sunday , no bowel movement, did not take today, asking if she can take OTC med for constipation, advise increase water IT, foods with fiber-apples, paulina fruit, etc, no pain

## 2021-04-12 NOTE — TELEPHONE ENCOUNTER
Patient had normal bowel movement this morning after last phone call. Also informed her of Abad message. No further questions.

## 2021-04-26 ENCOUNTER — IMMUNIZATION (OUTPATIENT)
Dept: LAB | Age: 66
End: 2021-04-26
Attending: HOSPITALIST
Payer: COMMERCIAL

## 2021-04-26 DIAGNOSIS — Z23 NEED FOR VACCINATION: Primary | ICD-10-CM

## 2021-04-26 PROCEDURE — 0002A SARSCOV2 VAC 30MCG/0.3ML IM: CPT

## 2022-09-08 ENCOUNTER — LAB ENCOUNTER (OUTPATIENT)
Dept: LAB | Age: 67
End: 2022-09-08
Attending: FAMILY MEDICINE
Payer: MEDICARE

## 2022-09-08 ENCOUNTER — EKG ENCOUNTER (OUTPATIENT)
Dept: LAB | Age: 67
End: 2022-09-08
Attending: FAMILY MEDICINE
Payer: MEDICARE

## 2022-09-08 ENCOUNTER — OFFICE VISIT (OUTPATIENT)
Dept: FAMILY MEDICINE CLINIC | Facility: CLINIC | Age: 67
End: 2022-09-08
Payer: MEDICARE

## 2022-09-08 VITALS
SYSTOLIC BLOOD PRESSURE: 137 MMHG | HEART RATE: 62 BPM | TEMPERATURE: 97 F | WEIGHT: 108 LBS | BODY MASS INDEX: 18.44 KG/M2 | HEIGHT: 64 IN | DIASTOLIC BLOOD PRESSURE: 80 MMHG

## 2022-09-08 DIAGNOSIS — Z00.00 ENCOUNTER FOR ANNUAL HEALTH EXAMINATION: ICD-10-CM

## 2022-09-08 DIAGNOSIS — Z12.31 VISIT FOR SCREENING MAMMOGRAM: ICD-10-CM

## 2022-09-08 DIAGNOSIS — Z11.59 NEED FOR HEPATITIS C SCREENING TEST: ICD-10-CM

## 2022-09-08 DIAGNOSIS — R09.89 GLOBUS SENSATION: ICD-10-CM

## 2022-09-08 DIAGNOSIS — Z23 NEED FOR VACCINATION: ICD-10-CM

## 2022-09-08 DIAGNOSIS — R07.89 ATYPICAL CHEST PAIN: ICD-10-CM

## 2022-09-08 DIAGNOSIS — E55.9 VITAMIN D DEFICIENCY: ICD-10-CM

## 2022-09-08 DIAGNOSIS — Z11.4 SCREENING FOR HIV (HUMAN IMMUNODEFICIENCY VIRUS): ICD-10-CM

## 2022-09-08 DIAGNOSIS — Z12.11 SCREENING FOR COLON CANCER: ICD-10-CM

## 2022-09-08 DIAGNOSIS — Z00.00 ADULT GENERAL MEDICAL EXAM: Primary | ICD-10-CM

## 2022-09-08 DIAGNOSIS — M81.0 AGE-RELATED OSTEOPOROSIS WITHOUT CURRENT PATHOLOGICAL FRACTURE: ICD-10-CM

## 2022-09-08 DIAGNOSIS — Z00.00 ADULT GENERAL MEDICAL EXAM: ICD-10-CM

## 2022-09-08 DIAGNOSIS — G44.209 TENSION HEADACHE: ICD-10-CM

## 2022-09-08 LAB
ALBUMIN SERPL-MCNC: 4.2 G/DL (ref 3.4–5)
ALBUMIN/GLOB SERPL: 1.1 {RATIO} (ref 1–2)
ALP LIVER SERPL-CCNC: 59 U/L
ALT SERPL-CCNC: 21 U/L
ANION GAP SERPL CALC-SCNC: 12 MMOL/L (ref 0–18)
AST SERPL-CCNC: 23 U/L (ref 15–37)
BASOPHILS # BLD AUTO: 0.06 X10(3) UL (ref 0–0.2)
BASOPHILS NFR BLD AUTO: 0.6 %
BILIRUB SERPL-MCNC: 0.5 MG/DL (ref 0.1–2)
BUN BLD-MCNC: 18 MG/DL (ref 7–18)
BUN/CREAT SERPL: 19.6 (ref 10–20)
CALCIUM BLD-MCNC: 9.3 MG/DL (ref 8.5–10.1)
CHLORIDE SERPL-SCNC: 102 MMOL/L (ref 98–112)
CHOLEST SERPL-MCNC: 209 MG/DL (ref ?–200)
CO2 SERPL-SCNC: 22 MMOL/L (ref 21–32)
CREAT BLD-MCNC: 0.92 MG/DL
DEPRECATED RDW RBC AUTO: 41.4 FL (ref 35.1–46.3)
EOSINOPHIL # BLD AUTO: 0.24 X10(3) UL (ref 0–0.7)
EOSINOPHIL NFR BLD AUTO: 2.3 %
ERYTHROCYTE [DISTWIDTH] IN BLOOD BY AUTOMATED COUNT: 12.5 % (ref 11–15)
FASTING PATIENT LIPID ANSWER: YES
FASTING STATUS PATIENT QL REPORTED: YES
GFR SERPLBLD BASED ON 1.73 SQ M-ARVRAT: 69 ML/MIN/1.73M2 (ref 60–?)
GLOBULIN PLAS-MCNC: 4 G/DL (ref 2.8–4.4)
GLUCOSE BLD-MCNC: 88 MG/DL (ref 70–99)
HCT VFR BLD AUTO: 46.1 %
HCV AB SERPL QL IA: NONREACTIVE
HDLC SERPL-MCNC: 75 MG/DL (ref 40–59)
HGB BLD-MCNC: 15.1 G/DL
IMM GRANULOCYTES # BLD AUTO: 0.02 X10(3) UL (ref 0–1)
IMM GRANULOCYTES NFR BLD: 0.2 %
LDLC SERPL CALC-MCNC: 120 MG/DL (ref ?–100)
LYMPHOCYTES # BLD AUTO: 1.22 X10(3) UL (ref 1–4)
LYMPHOCYTES NFR BLD AUTO: 11.6 %
MCH RBC QN AUTO: 29.5 PG (ref 26–34)
MCHC RBC AUTO-ENTMCNC: 32.8 G/DL (ref 31–37)
MCV RBC AUTO: 90.2 FL
MONOCYTES # BLD AUTO: 0.6 X10(3) UL (ref 0.1–1)
MONOCYTES NFR BLD AUTO: 5.7 %
NEUTROPHILS # BLD AUTO: 8.34 X10 (3) UL (ref 1.5–7.7)
NEUTROPHILS # BLD AUTO: 8.34 X10(3) UL (ref 1.5–7.7)
NEUTROPHILS NFR BLD AUTO: 79.6 %
NONHDLC SERPL-MCNC: 134 MG/DL (ref ?–130)
OSMOLALITY SERPL CALC.SUM OF ELEC: 283 MOSM/KG (ref 275–295)
PLATELET # BLD AUTO: 304 10(3)UL (ref 150–450)
POTASSIUM SERPL-SCNC: 3.6 MMOL/L (ref 3.5–5.1)
PROT SERPL-MCNC: 8.2 G/DL (ref 6.4–8.2)
RBC # BLD AUTO: 5.11 X10(6)UL
SODIUM SERPL-SCNC: 136 MMOL/L (ref 136–145)
TRIGL SERPL-MCNC: 80 MG/DL (ref 30–149)
TSI SER-ACNC: 2.27 MIU/ML (ref 0.36–3.74)
VIT D+METAB SERPL-MCNC: 60.3 NG/ML (ref 30–100)
VLDLC SERPL CALC-MCNC: 14 MG/DL (ref 0–30)
WBC # BLD AUTO: 10.5 X10(3) UL (ref 4–11)

## 2022-09-08 PROCEDURE — 93005 ELECTROCARDIOGRAM TRACING: CPT

## 2022-09-08 PROCEDURE — 80053 COMPREHEN METABOLIC PANEL: CPT

## 2022-09-08 PROCEDURE — 82306 VITAMIN D 25 HYDROXY: CPT

## 2022-09-08 PROCEDURE — 85025 COMPLETE CBC W/AUTO DIFF WBC: CPT

## 2022-09-08 PROCEDURE — 80061 LIPID PANEL: CPT

## 2022-09-08 PROCEDURE — 84443 ASSAY THYROID STIM HORMONE: CPT

## 2022-09-08 PROCEDURE — 86803 HEPATITIS C AB TEST: CPT

## 2022-09-08 PROCEDURE — 93010 ELECTROCARDIOGRAM REPORT: CPT | Performed by: FAMILY MEDICINE

## 2022-09-08 PROCEDURE — 36415 COLL VENOUS BLD VENIPUNCTURE: CPT

## 2022-09-08 PROCEDURE — 87389 HIV-1 AG W/HIV-1&-2 AB AG IA: CPT

## 2022-09-08 RX ORDER — FAMOTIDINE 20 MG/1
20 TABLET, FILM COATED ORAL 2 TIMES DAILY PRN
Qty: 180 TABLET | Refills: 1 | Status: SHIPPED | OUTPATIENT
Start: 2022-09-08 | End: 2023-03-07

## 2022-09-08 RX ORDER — FAMOTIDINE 20 MG/1
20 TABLET, FILM COATED ORAL DAILY
Qty: 180 TABLET | Refills: 1 | Status: SHIPPED | OUTPATIENT
Start: 2022-09-08 | End: 2022-09-08

## 2022-09-12 ENCOUNTER — TELEPHONE (OUTPATIENT)
Dept: FAMILY MEDICINE CLINIC | Facility: CLINIC | Age: 67
End: 2022-09-12

## 2022-09-14 DIAGNOSIS — E78.49 OTHER HYPERLIPIDEMIA: Primary | ICD-10-CM

## 2022-09-14 RX ORDER — ATORVASTATIN CALCIUM 20 MG/1
20 TABLET, FILM COATED ORAL NIGHTLY
Qty: 90 TABLET | Refills: 1 | Status: SHIPPED | OUTPATIENT
Start: 2022-09-14

## 2022-09-22 ENCOUNTER — HOSPITAL ENCOUNTER (OUTPATIENT)
Dept: BONE DENSITY | Age: 67
Discharge: HOME | End: 2022-09-22
Attending: FAMILY MEDICINE

## 2022-09-22 ENCOUNTER — HOSPITAL ENCOUNTER (OUTPATIENT)
Dept: MAMMOGRAPHY | Age: 67
Discharge: HOME | End: 2022-09-22
Attending: FAMILY MEDICINE

## 2022-09-22 DIAGNOSIS — M81.0 AGE-RELATED OSTEOPOROSIS WITHOUT CURRENT PATHOLOGICAL FRACTURE: ICD-10-CM

## 2022-09-22 DIAGNOSIS — Z12.31 VISIT FOR SCREENING MAMMOGRAM: ICD-10-CM

## 2022-09-22 PROCEDURE — 77067 SCR MAMMO BI INCL CAD: CPT | Performed by: FAMILY MEDICINE

## 2022-09-22 PROCEDURE — 77063 BREAST TOMOSYNTHESIS BI: CPT | Performed by: FAMILY MEDICINE

## 2022-09-22 PROCEDURE — 77080 DXA BONE DENSITY AXIAL: CPT | Performed by: FAMILY MEDICINE

## 2022-09-27 ENCOUNTER — TELEPHONE (OUTPATIENT)
Dept: GASTROENTEROLOGY | Facility: CLINIC | Age: 67
End: 2022-09-27

## 2022-09-27 ENCOUNTER — OFFICE VISIT (OUTPATIENT)
Dept: GASTROENTEROLOGY | Facility: CLINIC | Age: 67
End: 2022-09-27

## 2022-09-27 VITALS
HEIGHT: 64 IN | WEIGHT: 110 LBS | BODY MASS INDEX: 18.78 KG/M2 | SYSTOLIC BLOOD PRESSURE: 136 MMHG | DIASTOLIC BLOOD PRESSURE: 84 MMHG

## 2022-09-27 DIAGNOSIS — Z12.11 COLON CANCER SCREENING: Primary | ICD-10-CM

## 2022-09-27 PROCEDURE — 99214 OFFICE O/P EST MOD 30 MIN: CPT | Performed by: STUDENT IN AN ORGANIZED HEALTH CARE EDUCATION/TRAINING PROGRAM

## 2022-09-27 PROCEDURE — 3075F SYST BP GE 130 - 139MM HG: CPT | Performed by: STUDENT IN AN ORGANIZED HEALTH CARE EDUCATION/TRAINING PROGRAM

## 2022-09-27 PROCEDURE — 3008F BODY MASS INDEX DOCD: CPT | Performed by: STUDENT IN AN ORGANIZED HEALTH CARE EDUCATION/TRAINING PROGRAM

## 2022-09-27 PROCEDURE — 3079F DIAST BP 80-89 MM HG: CPT | Performed by: STUDENT IN AN ORGANIZED HEALTH CARE EDUCATION/TRAINING PROGRAM

## 2022-09-27 RX ORDER — SODIUM, POTASSIUM,MAG SULFATES 17.5-3.13G
SOLUTION, RECONSTITUTED, ORAL ORAL
Qty: 1 EACH | Refills: 0 | Status: SHIPPED | OUTPATIENT
Start: 2022-09-27

## 2022-09-27 NOTE — PATIENT INSTRUCTIONS
1. Schedule colonoscopy with MAC [Diagnosis: screening]    2.  bowel prep from pharmacy (Split dose suprep, if suprep not covered then golytely or moviprep)    3. Continue all medications for procedure    4. Read all bowel prep instructions carefully    5. AVOID seeds, nuts, popcorn, raw fruits and vegetables (cooked is okay) for 2-3 days before procedure    6. You MAY need to go for COVID testing 72 hours before procedure. The testing team will call you a few days before your procedure to discuss with you if testing is required. If you are asked to go for COVID testing and do not completed the test, the procedure cannot be performed. 7. If you start any NEW medication after your visit today, please notify us. Certain medications will need to be held before the procedure, or the procedure cannot be performed.

## 2022-09-27 NOTE — TELEPHONE ENCOUNTER
Scheduled for:  Colonoscopy 92418  Provider Name:  Dr Sheree Dietrich  Date:  12/30/2022  Location:  [facility]  Sedation:  MAC  Time:  0945 (pt is aware to arrive at [address] [place] [address])  Prep:  Colyte  Meds/Allergies Reconciled?:  Physician reviewed  Diagnosis with codes:  CCS  Z12.11  Was patient informed to call isurance with codes (Y/N):  Y     Referral sent?:  NA  [facility] or [address] [place] [address] notified?:  I sent an electronic request to Endo Scheduling and received a confirmation today. Medication Orders: Pt is aware to NOT take iron pills, herbal meds and diet supplements for 7 days before exam. Also to NOT take any form of alcohol, recreational drugs and any forms of ED meds 24 hours before exam.       Misc Orders:       Further instructions given by staff:  I discussed the prep intructions with the patient in office which she verbally understood. Copy of instructions was handed to patient as well. Patient was also advised he will receive a call from PAT nurse 72-24 hours prior procedure to schedule Covid test done.

## 2022-10-15 LAB — AMB EXT COVID-19 RESULT: DETECTED

## 2022-10-18 ENCOUNTER — TELEPHONE (OUTPATIENT)
Dept: FAMILY MEDICINE CLINIC | Facility: CLINIC | Age: 67
End: 2022-10-18

## 2022-10-18 NOTE — TELEPHONE ENCOUNTER
Patient calling ( identified name and  ) had to cancel her appt for tomorrow as she is  COVID +       States took home test on  10/15  and is Covid +  ( chart marked for Covid )     Advised to call back if symptoms and/ or condition worsens  Patient verbalizes understanding and agrees.

## 2022-11-08 ENCOUNTER — TELEPHONE (OUTPATIENT)
Dept: RHEUMATOLOGY | Facility: CLINIC | Age: 67
End: 2022-11-08

## 2022-11-08 ENCOUNTER — OFFICE VISIT (OUTPATIENT)
Dept: RHEUMATOLOGY | Facility: CLINIC | Age: 67
End: 2022-11-08
Payer: MEDICARE

## 2022-11-08 VITALS
HEART RATE: 71 BPM | SYSTOLIC BLOOD PRESSURE: 134 MMHG | RESPIRATION RATE: 16 BRPM | HEIGHT: 64 IN | BODY MASS INDEX: 18.27 KG/M2 | DIASTOLIC BLOOD PRESSURE: 79 MMHG | WEIGHT: 107 LBS

## 2022-11-08 DIAGNOSIS — M81.0 AGE-RELATED OSTEOPOROSIS WITHOUT CURRENT PATHOLOGICAL FRACTURE: Primary | ICD-10-CM

## 2022-11-08 PROCEDURE — 3008F BODY MASS INDEX DOCD: CPT | Performed by: INTERNAL MEDICINE

## 2022-11-08 PROCEDURE — 3078F DIAST BP <80 MM HG: CPT | Performed by: INTERNAL MEDICINE

## 2022-11-08 PROCEDURE — 99204 OFFICE O/P NEW MOD 45 MIN: CPT | Performed by: INTERNAL MEDICINE

## 2022-11-08 PROCEDURE — 3075F SYST BP GE 130 - 139MM HG: CPT | Performed by: INTERNAL MEDICINE

## 2022-11-08 PROCEDURE — 1126F AMNT PAIN NOTED NONE PRSNT: CPT | Performed by: INTERNAL MEDICINE

## 2022-11-08 NOTE — PATIENT INSTRUCTIONS
1. Plan for prolia 60mg every 6 months   2.  Check calcium level before prolia - can get with   3. Return to clinic in in 1 year -

## 2022-11-08 NOTE — TELEPHONE ENCOUNTER
Plan for prolia -will need authorization    Can we find out if ibandronate is covered - for a 2nd option - I will send in ibandronate if prolia is not authorized for any reason

## 2022-11-14 NOTE — TELEPHONE ENCOUNTER
Left message to call office back to schedule Prolia injection. Pt needs to have BMP completed 1-2 days prior to appointment.

## 2022-11-16 NOTE — TELEPHONE ENCOUNTER
Iraida from [facility].in [facility] on the phone donald like to receive a call back regarding the pt's prior authorization for prolia.  Please advise    Reference #: [id number]

## 2022-11-18 ENCOUNTER — TELEPHONE (OUTPATIENT)
Dept: RHEUMATOLOGY | Facility: CLINIC | Age: 67
End: 2022-11-18

## 2022-11-18 NOTE — TELEPHONE ENCOUNTER
Spoke with patient. Explained prolia was denied and insurance requires infusion. Explained reclast infusion to the best of my ability and answered all of her questions. No further questions at this time. She will wait to hear back if infusion gets approved.

## 2022-11-18 NOTE — TELEPHONE ENCOUNTER
Talked to pt.  And she is ok to start zolendronic acid - d/w her the risks -   She will get labs once she gets it's scheduled    Ok to put in the order for reclast

## 2022-11-21 PROBLEM — M81.0 AGE-RELATED OSTEOPOROSIS WITHOUT CURRENT PATHOLOGICAL FRACTURE: Status: ACTIVE | Noted: 2022-11-21

## 2022-11-21 NOTE — TELEPHONE ENCOUNTER
Provider to sign off on therapy plan.  PPD PA Department please obtain prior authorization for Reclast.

## 2022-11-25 NOTE — TELEPHONE ENCOUNTER
[facility] medical team, [phone], spoke pradeep Hyatt [place], medical intake team. Initiated PA for reclast. She states no Pa required for Reclast medical buy and bill. Referral message sent to infusion center pools.

## 2022-12-08 ENCOUNTER — TELEPHONE (OUTPATIENT)
Dept: FAMILY MEDICINE CLINIC | Facility: CLINIC | Age: 67
End: 2022-12-08

## 2022-12-08 NOTE — TELEPHONE ENCOUNTER
Spoke to patient (name and  of patient verified). She is calling for blood work instructions. Patient states she does not take high-dose biotin. Advised patient to fast for 12 hours and hold products that contain biotin for 48 hours. Patient reports she is taking a multivitamin and calcium supplement that may contain biotin. She plans to hold medication and have blood draw on Saturday.

## 2022-12-10 ENCOUNTER — LAB ENCOUNTER (OUTPATIENT)
Dept: LAB | Age: 67
End: 2022-12-10
Attending: INTERNAL MEDICINE
Payer: MEDICARE

## 2022-12-10 DIAGNOSIS — M81.0 AGE-RELATED OSTEOPOROSIS WITHOUT CURRENT PATHOLOGICAL FRACTURE: ICD-10-CM

## 2022-12-10 DIAGNOSIS — E78.49 OTHER HYPERLIPIDEMIA: ICD-10-CM

## 2022-12-10 LAB
ALT SERPL-CCNC: 23 U/L
ANION GAP SERPL CALC-SCNC: 5 MMOL/L (ref 0–18)
AST SERPL-CCNC: 20 U/L (ref 15–37)
BUN BLD-MCNC: 19 MG/DL (ref 7–18)
BUN/CREAT SERPL: 24.7 (ref 10–20)
CALCIUM BLD-MCNC: 9.6 MG/DL (ref 8.5–10.1)
CHLORIDE SERPL-SCNC: 103 MMOL/L (ref 98–112)
CHOLEST SERPL-MCNC: 131 MG/DL (ref ?–200)
CO2 SERPL-SCNC: 28 MMOL/L (ref 21–32)
CREAT BLD-MCNC: 0.77 MG/DL
FASTING PATIENT LIPID ANSWER: YES
FASTING STATUS PATIENT QL REPORTED: YES
GFR SERPLBLD BASED ON 1.73 SQ M-ARVRAT: 85 ML/MIN/1.73M2 (ref 60–?)
GLUCOSE BLD-MCNC: 96 MG/DL (ref 70–99)
HDLC SERPL-MCNC: 75 MG/DL (ref 40–59)
LDLC SERPL CALC-MCNC: 45 MG/DL (ref ?–100)
NONHDLC SERPL-MCNC: 56 MG/DL (ref ?–130)
OSMOLALITY SERPL CALC.SUM OF ELEC: 284 MOSM/KG (ref 275–295)
POTASSIUM SERPL-SCNC: 3.8 MMOL/L (ref 3.5–5.1)
SODIUM SERPL-SCNC: 136 MMOL/L (ref 136–145)
TRIGL SERPL-MCNC: 48 MG/DL (ref 30–149)
VLDLC SERPL CALC-MCNC: 7 MG/DL (ref 0–30)

## 2022-12-10 PROCEDURE — 80061 LIPID PANEL: CPT

## 2022-12-10 PROCEDURE — 80048 BASIC METABOLIC PNL TOTAL CA: CPT

## 2022-12-10 PROCEDURE — 84450 TRANSFERASE (AST) (SGOT): CPT

## 2022-12-10 PROCEDURE — 84460 ALANINE AMINO (ALT) (SGPT): CPT

## 2022-12-10 PROCEDURE — 36415 COLL VENOUS BLD VENIPUNCTURE: CPT

## 2022-12-12 ENCOUNTER — TELEPHONE (OUTPATIENT)
Dept: FAMILY MEDICINE CLINIC | Facility: CLINIC | Age: 67
End: 2022-12-12

## 2022-12-12 ENCOUNTER — OFFICE VISIT (OUTPATIENT)
Dept: HEMATOLOGY/ONCOLOGY | Facility: HOSPITAL | Age: 67
End: 2022-12-12
Attending: INTERNAL MEDICINE
Payer: MEDICARE

## 2022-12-12 VITALS
TEMPERATURE: 98 F | RESPIRATION RATE: 18 BRPM | OXYGEN SATURATION: 100 % | HEART RATE: 64 BPM | SYSTOLIC BLOOD PRESSURE: 157 MMHG | DIASTOLIC BLOOD PRESSURE: 74 MMHG

## 2022-12-12 DIAGNOSIS — M81.0 AGE-RELATED OSTEOPOROSIS WITHOUT CURRENT PATHOLOGICAL FRACTURE: Primary | ICD-10-CM

## 2022-12-12 PROCEDURE — 96365 THER/PROPH/DIAG IV INF INIT: CPT

## 2022-12-12 RX ORDER — ZOLEDRONIC ACID 5 MG/100ML
5 INJECTION, SOLUTION INTRAVENOUS ONCE
Status: CANCELLED | OUTPATIENT
Start: 2022-12-12

## 2022-12-12 RX ORDER — ZOLEDRONIC ACID 5 MG/100ML
5 INJECTION, SOLUTION INTRAVENOUS ONCE
Status: COMPLETED | OUTPATIENT
Start: 2022-12-12 | End: 2022-12-12

## 2022-12-12 RX ORDER — ZOLEDRONIC ACID 5 MG/100ML
INJECTION, SOLUTION INTRAVENOUS
Status: COMPLETED
Start: 2022-12-12 | End: 2022-12-12

## 2022-12-12 RX ADMIN — ZOLEDRONIC ACID 5 MG: 5 INJECTION, SOLUTION INTRAVENOUS at 12:46:00

## 2022-12-12 NOTE — TELEPHONE ENCOUNTER
Verified name and . Patient asking if she should refill her Atorvastatin- since prescription was sent by Dr. Efrain Gonzalez with 90 day supply and one refill on 22- patient was advised to notify pharmacy to initiate refill. Patient verbalizes understanding and agrees with plan.

## 2022-12-12 NOTE — PROGRESS NOTES
Presents for reclast ordered for osteoporosis. Plan of care explained; CRCL wnl, no dental concerns, potential side effects of and how to manage. All questions answered to the best of my ability. Reclast infused with no s/s of adverse reaction noted. Post flush given. Discharged stable.

## 2022-12-18 ENCOUNTER — MOBILE ENCOUNTER (OUTPATIENT)
Dept: FAMILY MEDICINE CLINIC | Facility: CLINIC | Age: 67
End: 2022-12-18

## 2022-12-18 RX ORDER — NITROFURANTOIN 25; 75 MG/1; MG/1
100 CAPSULE ORAL 2 TIMES DAILY
Qty: 10 CAPSULE | Refills: 0 | Status: SHIPPED | OUTPATIENT
Start: 2022-12-18 | End: 2022-12-23

## 2022-12-18 NOTE — PROGRESS NOTES
On-call note. Patient requesting antibiotics for UTI. States that she has to get to work in a few hours and describes dysuria urgency and frequency. Macrobid sent to pharmacy.

## 2022-12-27 ENCOUNTER — TELEPHONE (OUTPATIENT)
Facility: CLINIC | Age: 67
End: 2022-12-27

## 2022-12-27 NOTE — TELEPHONE ENCOUNTER
Contacted patient and clarified all procedure questions. Patient verbalized understanding, all questions answered.

## 2022-12-30 ENCOUNTER — HOSPITAL ENCOUNTER (OUTPATIENT)
Facility: HOSPITAL | Age: 67
Setting detail: HOSPITAL OUTPATIENT SURGERY
Discharge: HOME | End: 2022-12-30
Attending: STUDENT IN AN ORGANIZED HEALTH CARE EDUCATION/TRAINING PROGRAM | Admitting: STUDENT IN AN ORGANIZED HEALTH CARE EDUCATION/TRAINING PROGRAM
Payer: MEDICARE

## 2022-12-30 ENCOUNTER — ANESTHESIA EVENT (OUTPATIENT)
Dept: ENDOSCOPY | Facility: HOSPITAL | Age: 67
End: 2022-12-30
Payer: MEDICARE

## 2022-12-30 ENCOUNTER — ANESTHESIA (OUTPATIENT)
Dept: ENDOSCOPY | Facility: HOSPITAL | Age: 67
End: 2022-12-30
Payer: MEDICARE

## 2022-12-30 VITALS
DIASTOLIC BLOOD PRESSURE: 69 MMHG | HEIGHT: 64 IN | TEMPERATURE: 98 F | BODY MASS INDEX: 18.44 KG/M2 | SYSTOLIC BLOOD PRESSURE: 126 MMHG | WEIGHT: 108 LBS | RESPIRATION RATE: 15 BRPM | HEART RATE: 69 BPM | OXYGEN SATURATION: 99 %

## 2022-12-30 DIAGNOSIS — Z12.11 COLON CANCER SCREENING: ICD-10-CM

## 2022-12-30 PROCEDURE — 45380 COLONOSCOPY AND BIOPSY: CPT | Performed by: STUDENT IN AN ORGANIZED HEALTH CARE EDUCATION/TRAINING PROGRAM

## 2022-12-30 PROCEDURE — 0DBP8ZX EXCISION OF RECTUM, VIA NATURAL OR ARTIFICIAL OPENING ENDOSCOPIC, DIAGNOSTIC: ICD-10-PCS | Performed by: STUDENT IN AN ORGANIZED HEALTH CARE EDUCATION/TRAINING PROGRAM

## 2022-12-30 PROCEDURE — 0DBH8ZX EXCISION OF CECUM, VIA NATURAL OR ARTIFICIAL OPENING ENDOSCOPIC, DIAGNOSTIC: ICD-10-PCS | Performed by: STUDENT IN AN ORGANIZED HEALTH CARE EDUCATION/TRAINING PROGRAM

## 2022-12-30 RX ORDER — SODIUM CHLORIDE, SODIUM LACTATE, POTASSIUM CHLORIDE, CALCIUM CHLORIDE 600; 310; 30; 20 MG/100ML; MG/100ML; MG/100ML; MG/100ML
INJECTION, SOLUTION INTRAVENOUS CONTINUOUS
Status: DISCONTINUED | OUTPATIENT
Start: 2022-12-30 | End: 2022-12-30

## 2022-12-30 RX ORDER — NALOXONE HYDROCHLORIDE 0.4 MG/ML
80 INJECTION, SOLUTION INTRAMUSCULAR; INTRAVENOUS; SUBCUTANEOUS AS NEEDED
Status: DISCONTINUED | OUTPATIENT
Start: 2022-12-30 | End: 2022-12-30

## 2022-12-30 NOTE — DISCHARGE INSTRUCTIONS
Home Care Instructions for Colonoscopy with Sedation    Diet:  - Resume your regular diet as tolerated unless otherwise instructed. - Start with light meals to minimize bloating.  - Do not drink alcohol today. Medication:  - If you have questions about resuming your normal medications, please contact your Primary Care Physician. Activities:  - Take it easy today. Do not return to work today. - Do not drive today. - Do not operate any machinery today (including kitchen equipment). Colonoscopy:  - You may notice some rectal \"spotting\" (a little blood on the toilet tissue) for a day or two after the exam. This is normal.  - If you experience any rectal bleeding (not spotting), persistent tenderness or sharp severe abdominal pains, oral temperature over 100 degrees Fahrenheit, light-headedness or dizziness, or any other problems, contact your doctor. **If unable to reach your doctor, please go to the [facility] Emergency Room**    - Your referring physician will receive a full report of your examination.  - If you do not hear from your doctor's office within two weeks of your biopsy, please call them for your results. You may be able to see your laboratory results in [employer] between 4 and 7 business days. In some cases, your physician may not have viewed the results before they are released to [employer]. If you have questions regarding your results contact the physician who ordered the test/exam by phone or via [employer] by choosing \"Ask a Medical Question. \"

## 2022-12-30 NOTE — ANESTHESIA POSTPROCEDURE EVALUATION
Patient: Alvaro Rodriguez    Procedure Summary     Date: 12/30/22 Room / Location: [facility] ENDOSCOPY 04 / [facility] ENDOSCOPY    Anesthesia Start: 8674 Anesthesia Stop:     Procedure: COLONOSCOPY-SCREENING Diagnosis:       Colon cancer screening      (colon polyps, hemorrhoids, rectal ulcer)    Surgeons: Fan Lawson MD Anesthesiologist: Cesar Parks CRNA    Anesthesia Type: MAC ASA Status: 2          Anesthesia Type: MAC    Vitals Value Taken Time   BP 91/70 12/30/22 1029   Temp 0 12/30/22 1029   Pulse 75 12/30/22 1029   Resp 16 12/30/22 1029   SpO2 98 12/30/22 1029       [facility] AN Post Evaluation:   Patient Evaluated in PACU  Patient Participation: complete - patient participated  Level of Consciousness: sleepy but conscious  Pain Score: 0  Pain Management: adequate  Airway Patency:patent    Comments: Report to Mukul Morgan CRNA  12/30/2022 10:29 AM

## 2023-01-03 NOTE — PROGRESS NOTES
GI Staff:    Can you please place a recall for this patient to have a colonoscopy repeated in 7 years. Thank you.     Gurinder Norton MD

## 2023-01-05 ENCOUNTER — TELEPHONE (OUTPATIENT)
Facility: CLINIC | Age: 68
End: 2023-01-05

## 2023-01-05 NOTE — TELEPHONE ENCOUNTER
----- Message from Sylvia Mares MD sent at 1/3/2023  4:24 PM CST -----  GI Staff:    Can you please place a recall for this patient to have a colonoscopy repeated in 7 years. Thank you.     Sylvia Mares MD

## 2023-01-05 NOTE — TELEPHONE ENCOUNTER
Health maintenance updated. Last colonoscopy done 12/30/22 by Dr. Mari Foster    7 year recall placed into Pt Outreach    Next due on 12/30/29  per Dr. Mari Foster.

## 2023-02-02 ENCOUNTER — TELEPHONE (OUTPATIENT)
Dept: FAMILY MEDICINE CLINIC | Facility: CLINIC | Age: 68
End: 2023-02-02

## 2023-03-13 ENCOUNTER — TELEPHONE (OUTPATIENT)
Dept: FAMILY MEDICINE CLINIC | Facility: CLINIC | Age: 68
End: 2023-03-13

## 2023-03-13 NOTE — TELEPHONE ENCOUNTER
Verified name and . Patient requesting appointment to be seen for medication follow up. She states she was started on Atorvastatin and Famotidine about 6 months ago and since then, has been having sensation that she needs to clear her throat often. She denies any problems swallowing or eating.     Appointment scheduled:  Future Appointments   Date Time Provider Pelon Lim   3/14/2023 11:00 AM Angel Collazo DO ECCEC[facility] ALMANZARO 0

## 2023-03-14 ENCOUNTER — OFFICE VISIT (OUTPATIENT)
Dept: FAMILY MEDICINE CLINIC | Facility: CLINIC | Age: 68
End: 2023-03-14

## 2023-03-14 VITALS
HEIGHT: 64 IN | HEART RATE: 66 BPM | BODY MASS INDEX: 17.93 KG/M2 | SYSTOLIC BLOOD PRESSURE: 125 MMHG | TEMPERATURE: 97 F | WEIGHT: 105 LBS | DIASTOLIC BLOOD PRESSURE: 76 MMHG

## 2023-03-14 DIAGNOSIS — E78.2 MIXED HYPERLIPIDEMIA: ICD-10-CM

## 2023-03-14 DIAGNOSIS — R09.89 THROAT CLEARING: Primary | ICD-10-CM

## 2023-03-14 DIAGNOSIS — J30.89 OTHER ALLERGIC RHINITIS: ICD-10-CM

## 2023-03-14 DIAGNOSIS — K21.9 GASTROESOPHAGEAL REFLUX DISEASE, UNSPECIFIED WHETHER ESOPHAGITIS PRESENT: ICD-10-CM

## 2023-03-14 PROBLEM — E46 PROTEIN-CALORIE MALNUTRITION, UNSPECIFIED SEVERITY (HCC): Status: ACTIVE | Noted: 2023-03-14

## 2023-03-14 PROCEDURE — 3078F DIAST BP <80 MM HG: CPT | Performed by: FAMILY MEDICINE

## 2023-03-14 PROCEDURE — 3074F SYST BP LT 130 MM HG: CPT | Performed by: FAMILY MEDICINE

## 2023-03-14 PROCEDURE — 3008F BODY MASS INDEX DOCD: CPT | Performed by: FAMILY MEDICINE

## 2023-03-14 PROCEDURE — 99214 OFFICE O/P EST MOD 30 MIN: CPT | Performed by: FAMILY MEDICINE

## 2023-03-14 RX ORDER — ATORVASTATIN CALCIUM 20 MG/1
20 TABLET, FILM COATED ORAL NIGHTLY
Qty: 90 TABLET | Refills: 3 | Status: SHIPPED | OUTPATIENT
Start: 2023-03-14

## 2023-03-14 RX ORDER — FAMOTIDINE 20 MG/1
20 TABLET, FILM COATED ORAL 2 TIMES DAILY PRN
Qty: 180 TABLET | Refills: 3 | Status: SHIPPED | OUTPATIENT
Start: 2023-03-14 | End: 2023-09-10

## 2023-03-14 RX ORDER — FAMOTIDINE 20 MG/1
20 TABLET, FILM COATED ORAL 2 TIMES DAILY
COMMUNITY

## 2023-03-14 RX ORDER — LEVOCETIRIZINE DIHYDROCHLORIDE 5 MG/1
5 TABLET, FILM COATED ORAL EVERY MORNING
Qty: 90 TABLET | Refills: 0 | Status: SHIPPED | OUTPATIENT
Start: 2023-03-14

## 2023-03-14 RX ORDER — MONTELUKAST SODIUM 10 MG/1
10 TABLET ORAL NIGHTLY
Qty: 90 TABLET | Refills: 1 | Status: SHIPPED | OUTPATIENT
Start: 2023-03-14 | End: 2023-09-10

## 2023-05-19 ENCOUNTER — TELEPHONE (OUTPATIENT)
Dept: FAMILY MEDICINE CLINIC | Facility: CLINIC | Age: 68
End: 2023-05-19

## 2023-05-19 NOTE — TELEPHONE ENCOUNTER
Patient outreach to schedule a Medicare annual well visit appointment with Dr. Spike Hunt. Last visit on 9/8/2022.

## 2023-06-08 ENCOUNTER — TELEPHONE (OUTPATIENT)
Dept: FAMILY MEDICINE CLINIC | Facility: CLINIC | Age: 68
End: 2023-06-08

## 2023-06-08 DIAGNOSIS — J30.89 OTHER ALLERGIC RHINITIS: ICD-10-CM

## 2023-06-08 DIAGNOSIS — R09.89 THROAT CLEARING: ICD-10-CM

## 2023-06-08 RX ORDER — MONTELUKAST SODIUM 10 MG/1
10 TABLET ORAL NIGHTLY
Qty: 90 TABLET | Refills: 1 | Status: SHIPPED | OUTPATIENT
Start: 2023-06-08 | End: 2023-12-05

## 2023-06-08 RX ORDER — LEVOCETIRIZINE DIHYDROCHLORIDE 5 MG/1
5 TABLET, FILM COATED ORAL EVERY MORNING
Qty: 90 TABLET | Refills: 0 | Status: SHIPPED | OUTPATIENT
Start: 2023-06-08

## 2023-06-08 NOTE — TELEPHONE ENCOUNTER
They are to be taken when symptomatic. If she gets off these 2 medications and her symptoms come back then she should get back on it for 3 to 4 weeks and then try to stop and see if she still needs it again.

## 2023-06-08 NOTE — TELEPHONE ENCOUNTER
Spoke with pt,  verified, she is returning our call. Pt was informed of  MD recommendation, pt stated understanding.

## 2023-06-08 NOTE — TELEPHONE ENCOUNTER
Dr. Morris Alexandra, do you want patient to use this medication as needed or continue to take daily. Seasonal allergies indicated in your note. Copied from your last office note:    Throat clearing  -     levocetirizine 5 MG Oral Tab; Take 1 tablet (5 mg total) by mouth every morning.  -     montelukast (SINGULAIR) 10 MG Oral Tab; Take 1 tablet (10 mg total) by mouth nightly. Montelukast in the evening and Xyzal in the morning. This looks like allergies to me. Other allergic rhinitis  -     levocetirizine 5 MG Oral Tab; Take 1 tablet (5 mg total) by mouth every morning.  -     montelukast (SINGULAIR) 10 MG Oral Tab; Take 1 tablet (10 mg total) by mouth nightly.

## 2023-06-08 NOTE — TELEPHONE ENCOUNTER
Patient calling regarding:  levocetirizine 5 MG Oral Tab     She is out of refills on this medication, she does not know if Dr. Iram Ronquillo would like her to keep taking this medication, if so  Please send to:  [id number] - [place] -

## 2023-06-26 ENCOUNTER — TELEPHONE (OUTPATIENT)
Dept: FAMILY MEDICINE CLINIC | Facility: CLINIC | Age: 68
End: 2023-06-26

## 2023-06-26 NOTE — TELEPHONE ENCOUNTER
Patient outreach to schedule a Medicare annual well visit appointment with Dr. Alyssa Walter. Last px on 9/8/2022. Pt stated she will schedule appt on her own time.

## 2023-09-09 ENCOUNTER — LAB ENCOUNTER (OUTPATIENT)
Dept: LAB | Age: 68
End: 2023-09-09
Attending: FAMILY MEDICINE
Payer: MEDICARE

## 2023-09-09 ENCOUNTER — OFFICE VISIT (OUTPATIENT)
Dept: FAMILY MEDICINE CLINIC | Facility: CLINIC | Age: 68
End: 2023-09-09

## 2023-09-09 VITALS
WEIGHT: 117 LBS | HEART RATE: 59 BPM | DIASTOLIC BLOOD PRESSURE: 77 MMHG | BODY MASS INDEX: 19.97 KG/M2 | SYSTOLIC BLOOD PRESSURE: 128 MMHG | TEMPERATURE: 97 F | HEIGHT: 64 IN

## 2023-09-09 DIAGNOSIS — J30.89 OTHER ALLERGIC RHINITIS: ICD-10-CM

## 2023-09-09 DIAGNOSIS — R79.89 ELEVATED TSH: ICD-10-CM

## 2023-09-09 DIAGNOSIS — Z12.31 VISIT FOR SCREENING MAMMOGRAM: ICD-10-CM

## 2023-09-09 DIAGNOSIS — Z23 FLU VACCINE NEED: ICD-10-CM

## 2023-09-09 DIAGNOSIS — K21.9 GASTROESOPHAGEAL REFLUX DISEASE, UNSPECIFIED WHETHER ESOPHAGITIS PRESENT: ICD-10-CM

## 2023-09-09 DIAGNOSIS — E55.9 VITAMIN D DEFICIENCY: ICD-10-CM

## 2023-09-09 DIAGNOSIS — Z00.00 ADULT GENERAL MEDICAL EXAM: Primary | ICD-10-CM

## 2023-09-09 DIAGNOSIS — E78.2 MIXED HYPERLIPIDEMIA: ICD-10-CM

## 2023-09-09 DIAGNOSIS — Z00.00 ENCOUNTER FOR ANNUAL HEALTH EXAMINATION: ICD-10-CM

## 2023-09-09 DIAGNOSIS — Z00.00 ADULT GENERAL MEDICAL EXAM: ICD-10-CM

## 2023-09-09 DIAGNOSIS — M81.0 AGE-RELATED OSTEOPOROSIS WITHOUT CURRENT PATHOLOGICAL FRACTURE: ICD-10-CM

## 2023-09-09 PROBLEM — E46 PROTEIN-CALORIE MALNUTRITION, UNSPECIFIED SEVERITY (HCC): Status: RESOLVED | Noted: 2023-03-14 | Resolved: 2023-09-09

## 2023-09-09 PROBLEM — J44.9 ASTHMA WITH COPD (CHRONIC OBSTRUCTIVE PULMONARY DISEASE) (HCC): Chronic | Status: ACTIVE | Noted: 2023-09-09

## 2023-09-09 PROBLEM — J44.89 ASTHMA WITH COPD (CHRONIC OBSTRUCTIVE PULMONARY DISEASE) (HCC): Chronic | Status: ACTIVE | Noted: 2023-09-09

## 2023-09-09 PROBLEM — J44.89 ASTHMA WITH COPD (CHRONIC OBSTRUCTIVE PULMONARY DISEASE): Chronic | Status: ACTIVE | Noted: 2023-09-09

## 2023-09-09 LAB
ALBUMIN SERPL-MCNC: 4.1 G/DL (ref 3.4–5)
ALBUMIN/GLOB SERPL: 1.1 {RATIO} (ref 1–2)
ALP LIVER SERPL-CCNC: 65 U/L
ALT SERPL-CCNC: 21 U/L
ANION GAP SERPL CALC-SCNC: 3 MMOL/L (ref 0–18)
AST SERPL-CCNC: 21 U/L (ref 15–37)
BASOPHILS # BLD AUTO: 0.07 X10(3) UL (ref 0–0.2)
BASOPHILS NFR BLD AUTO: 0.7 %
BILIRUB SERPL-MCNC: 0.6 MG/DL (ref 0.1–2)
BUN BLD-MCNC: 13 MG/DL (ref 7–18)
CALCIUM BLD-MCNC: 9.6 MG/DL (ref 8.5–10.1)
CHLORIDE SERPL-SCNC: 102 MMOL/L (ref 98–112)
CHOLEST SERPL-MCNC: 136 MG/DL (ref ?–200)
CO2 SERPL-SCNC: 32 MMOL/L (ref 21–32)
CREAT BLD-MCNC: 0.95 MG/DL
EGFRCR SERPLBLD CKD-EPI 2021: 66 ML/MIN/1.73M2 (ref 60–?)
EOSINOPHIL # BLD AUTO: 0.19 X10(3) UL (ref 0–0.7)
EOSINOPHIL NFR BLD AUTO: 2 %
ERYTHROCYTE [DISTWIDTH] IN BLOOD BY AUTOMATED COUNT: 12.7 %
FASTING PATIENT LIPID ANSWER: YES
FASTING STATUS PATIENT QL REPORTED: YES
GLOBULIN PLAS-MCNC: 3.6 G/DL (ref 2.8–4.4)
GLUCOSE BLD-MCNC: 88 MG/DL (ref 70–99)
HCT VFR BLD AUTO: 44.6 %
HDLC SERPL-MCNC: 67 MG/DL (ref 40–59)
HGB BLD-MCNC: 14.6 G/DL
IMM GRANULOCYTES # BLD AUTO: 0.03 X10(3) UL (ref 0–1)
IMM GRANULOCYTES NFR BLD: 0.3 %
LDLC SERPL CALC-MCNC: 54 MG/DL (ref ?–100)
LYMPHOCYTES # BLD AUTO: 1.06 X10(3) UL (ref 1–4)
LYMPHOCYTES NFR BLD AUTO: 11.3 %
MCH RBC QN AUTO: 29.4 PG (ref 26–34)
MCHC RBC AUTO-ENTMCNC: 32.7 G/DL (ref 31–37)
MCV RBC AUTO: 89.9 FL
MONOCYTES # BLD AUTO: 0.62 X10(3) UL (ref 0.1–1)
MONOCYTES NFR BLD AUTO: 6.6 %
NEUTROPHILS # BLD AUTO: 7.42 X10 (3) UL (ref 1.5–7.7)
NEUTROPHILS # BLD AUTO: 7.42 X10(3) UL (ref 1.5–7.7)
NEUTROPHILS NFR BLD AUTO: 79.1 %
NONHDLC SERPL-MCNC: 69 MG/DL (ref ?–130)
OSMOLALITY SERPL CALC.SUM OF ELEC: 284 MOSM/KG (ref 275–295)
PLATELET # BLD AUTO: 275 10(3)UL (ref 150–450)
POTASSIUM SERPL-SCNC: 4.2 MMOL/L (ref 3.5–5.1)
PROT SERPL-MCNC: 7.7 G/DL (ref 6.4–8.2)
RBC # BLD AUTO: 4.96 X10(6)UL
SODIUM SERPL-SCNC: 137 MMOL/L (ref 136–145)
T3FREE SERPL-MCNC: 2.89 PG/ML (ref 2.4–4.2)
T4 FREE SERPL-MCNC: 1 NG/DL (ref 0.8–1.7)
TRIGL SERPL-MCNC: 76 MG/DL (ref 30–149)
TSI SER-ACNC: 3.79 MIU/ML (ref 0.36–3.74)
VIT D+METAB SERPL-MCNC: 67 NG/ML (ref 30–100)
VLDLC SERPL CALC-MCNC: 11 MG/DL (ref 0–30)
WBC # BLD AUTO: 9.4 X10(3) UL (ref 4–11)

## 2023-09-09 PROCEDURE — 84443 ASSAY THYROID STIM HORMONE: CPT

## 2023-09-09 PROCEDURE — 96160 PT-FOCUSED HLTH RISK ASSMT: CPT | Performed by: FAMILY MEDICINE

## 2023-09-09 PROCEDURE — 3008F BODY MASS INDEX DOCD: CPT | Performed by: FAMILY MEDICINE

## 2023-09-09 PROCEDURE — 80061 LIPID PANEL: CPT

## 2023-09-09 PROCEDURE — 90662 IIV NO PRSV INCREASED AG IM: CPT | Performed by: FAMILY MEDICINE

## 2023-09-09 PROCEDURE — 84481 FREE ASSAY (FT-3): CPT | Performed by: FAMILY MEDICINE

## 2023-09-09 PROCEDURE — 85025 COMPLETE CBC W/AUTO DIFF WBC: CPT

## 2023-09-09 PROCEDURE — 1126F AMNT PAIN NOTED NONE PRSNT: CPT | Performed by: FAMILY MEDICINE

## 2023-09-09 PROCEDURE — G0008 ADMIN INFLUENZA VIRUS VAC: HCPCS | Performed by: FAMILY MEDICINE

## 2023-09-09 PROCEDURE — 80053 COMPREHEN METABOLIC PANEL: CPT

## 2023-09-09 PROCEDURE — 3078F DIAST BP <80 MM HG: CPT | Performed by: FAMILY MEDICINE

## 2023-09-09 PROCEDURE — 36415 COLL VENOUS BLD VENIPUNCTURE: CPT

## 2023-09-09 PROCEDURE — 82306 VITAMIN D 25 HYDROXY: CPT

## 2023-09-09 PROCEDURE — 1170F FXNL STATUS ASSESSED: CPT | Performed by: FAMILY MEDICINE

## 2023-09-09 PROCEDURE — G0439 PPPS, SUBSEQ VISIT: HCPCS | Performed by: FAMILY MEDICINE

## 2023-09-09 PROCEDURE — 84439 ASSAY OF FREE THYROXINE: CPT | Performed by: FAMILY MEDICINE

## 2023-09-09 PROCEDURE — 3074F SYST BP LT 130 MM HG: CPT | Performed by: FAMILY MEDICINE

## 2023-09-09 PROCEDURE — 1159F MED LIST DOCD IN RCRD: CPT | Performed by: FAMILY MEDICINE

## 2023-09-09 RX ORDER — ATORVASTATIN CALCIUM 20 MG/1
20 TABLET, FILM COATED ORAL NIGHTLY
Qty: 90 TABLET | Refills: 3 | Status: SHIPPED | OUTPATIENT
Start: 2023-09-09

## 2023-10-05 ENCOUNTER — NURSE TRIAGE (OUTPATIENT)
Dept: FAMILY MEDICINE CLINIC | Facility: CLINIC | Age: 68
End: 2023-10-05

## 2023-10-05 NOTE — TELEPHONE ENCOUNTER
Please reply to pool: EM RN TRIAGE  Action Requested: Summary for Provider     []  Critical Lab, Recommendations Needed  [] Need Additional Advice  [x]   FYI    []   Need Orders  [] Need Medications Sent to Pharmacy  []  Other     SUMMARY: Patient contacts clinic reporting congestion and sweatiness that began 3 days ago. Symptoms are currently improved. Denies fever, chest pain or shortness of breath. She inquires about covid testing but would like to know cost first.  Declines appointment at this time. Transferred to [facility] services for inquiry, she will call back if she desires an appointment. Also discussed testing options at her local pharmacy.     Reason for call: Acute  Onset: Data Unavailable                       Reason for Disposition   COVID-19 Testing, questions about    Protocols used: Coronavirus (IQGEF-07) Diagnosed or [id number]

## 2024-02-21 ENCOUNTER — APPOINTMENT (OUTPATIENT)
Dept: CT IMAGING | Age: 69
End: 2024-02-21
Attending: Physician Assistant
Payer: COMMERCIAL

## 2024-02-21 ENCOUNTER — HOSPITAL ENCOUNTER (OUTPATIENT)
Dept: MAMMOGRAPHY | Age: 69
Discharge: HOME | End: 2024-02-21
Attending: FAMILY MEDICINE
Payer: MEDICARE

## 2024-02-21 ENCOUNTER — HOSPITAL ENCOUNTER (OUTPATIENT)
Age: 69
Discharge: HOME | End: 2024-02-21
Attending: Physician Assistant
Payer: COMMERCIAL

## 2024-02-21 ENCOUNTER — NURSE TRIAGE (OUTPATIENT)
Dept: FAMILY MEDICINE CLINIC | Facility: CLINIC | Age: 69
End: 2024-02-21

## 2024-02-21 VITALS
TEMPERATURE: 98 F | DIASTOLIC BLOOD PRESSURE: 94 MMHG | OXYGEN SATURATION: 99 % | RESPIRATION RATE: 18 BRPM | SYSTOLIC BLOOD PRESSURE: 142 MMHG | HEART RATE: 99 BPM
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
## 2024-02-21 DIAGNOSIS — Z12.31 VISIT FOR SCREENING MAMMOGRAM: ICD-10-CM

## 2024-02-21 PROCEDURE — 72125 CT NECK SPINE W/O DYE: CPT | Performed by: PHYSICIAN ASSISTANT

## 2024-02-21 PROCEDURE — 99203 OFFICE O/P NEW LOW 30 MIN: CPT | Performed by: PHYSICIAN ASSISTANT

## 2024-02-21 PROCEDURE — 70450 CT HEAD/BRAIN W/O DYE: CPT | Performed by: PHYSICIAN ASSISTANT

## 2024-02-21 PROCEDURE — 77067 SCR MAMMO BI INCL CAD: CPT | Performed by: FAMILY MEDICINE

## 2024-02-21 PROCEDURE — 77063 BREAST TOMOSYNTHESIS BI: CPT | Performed by: FAMILY MEDICINE
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
## 2024-02-21 NOTE — TELEPHONE ENCOUNTER
Patient notified of provider's recommendation.  Plans to go to IC. Patient verbalized understanding.
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
## 2024-03-03 DIAGNOSIS — K21.9 GASTROESOPHAGEAL REFLUX DISEASE, UNSPECIFIED WHETHER ESOPHAGITIS PRESENT: ICD-10-CM

## 2024-03-05 RX ORDER — FAMOTIDINE 20 MG/1
20 TABLET, FILM COATED ORAL 2 TIMES DAILY PRN
Qty: 180 TABLET | Refills: 3 | Status: SHIPPED | OUTPATIENT
Start: 2024-03-05

## 2024-03-05 NOTE — TELEPHONE ENCOUNTER
Refill passed per protocol.    Requested Prescriptions   Pending Prescriptions Disp Refills    FAMOTIDINE 20 MG Oral Tab [Pharmacy Med Name: Famotidine 20 Mg Tab Teva] 180 tablet 0     Sig: Take 1 tablet (20 mg total) by mouth 2 (two) times daily as needed for Heartburn.       Gastrointestional Medication Protocol Passed - 3/3/2024  1:30 AM        Passed - In person appointment or virtual visit in the past 12 mos or appointment in next 3 mos     Recent Outpatient Visits              [facility]    11 months [facility]     [facility]    1 year ago Age-related osteoporosis without current pathological fracture    [facility]    Office Visit    1 year ago Age-related osteoporosis without current pathological fracture    [facility], Enrique Rushing MD    Office Visit    1 year ago Colon cancer screening    [facility], Misael Rivera MD    Office Visit                             Recent Outpatient Visits              5 months ago Adult general medical exam    [facility] Angel Ramirez,     Office Visit    11 months ago [facility]roat clearing    [facility]     [facility]    1 year ago Age-related osteoporosis without current pathological fracture    [facility]    Office Visit    1 year ago Age-related [facility] without current pathological fracture    [facility]    [facility]    1 year ago Colon cancer screening    [facility], Misael Rivera MD     Office Visit

## 2024-03-20 NOTE — TELEPHONE ENCOUNTER
Physical Therapy  Physical Therapy Treatment Note    Patient Name: Bret Bonilla  MRN: [id number]  Today's Date: 3/20/2024  Time Calculation  Start Time: 1530  Stop Time: 1615  Time Calculation (min): 45 min  PT Therapeutic Procedures Time Entry  Manual Therapy Time Entry: 10  Therapeutic Exercise Time Entry: 30        Insurance:  Visit number: 2 of 30  Authorization info: No auth   Insurance Type: Payor: [facility] / Plan: [employer] [facility] [employer] HEALTH PLAN / Product Type: *No Product type* /   Current Problem  1. Left ankle injury, subsequent encounter          General     Precautions     Subjective:     Patient reports that he has been keeping up with his HEP and has not been having increased pain  Pain     Objective:                        ROM                              Knee AROM (Degrees)                          (R)                    (L)  Flexion:            wfl                    wfl          Extension:        wfl                    wfl                                  Ankle AROM (Degrees)                          (R)                    (L)  Plantarflexion:  60                     60                       Dorsiflexion:     14                     14                       Inversion:         20                     20                                   Eversion:          10                     10                                                           Strength Testing  Hip                          (R)                    (L)  Flexion:            5                      5                                    Extension:        5                      5                        Abduction:       5                      5                        Adduction:       5                      5                                    Ankle                          (R)                    (L)  Plantarflexion:  5                      5                                                Dorsiflexion:     5                      5                        Inversion:     Mammogram has been ordered but she needs to see us for a full physical exam.  We have not done a full physical on her since 2013.      5                      5                                    Eversion:          5                      5                                       Palpation: TTP over incision and hardware.   Ankle/Foot Joint Mobility: TC hypomobility noted L                          Functional Screening  Squat: B pes planus and knee valgus  SL Quarter Squat: pain on L with difficulty performing  SL calf raise R 36 reps                      L 30 reps  Weight bearing Lunge test R 10cm knee to wall                                             L at 10 cm knee 6cm from wall  Outcome Measures:  Other Measures  Lower Extremity Funtional Score (LEFS): 72/80   Treatments:     THERE EX  Banded ankle eversion, dorsiflexion 2 x 10   Banded ankle inversion holds 3 x 30 sec   Gastroc and soleus stretch 3 x 30 sec   Banded side step 3 x 30' (blue)   SL stance with reach 3 x 10    MANUAL  AP mob of L ankle   Distraction of ankle   Calcaneal distraction       Assessment:  Pt tolerated session well. Pt was able to perform banded ankle df and eversion pain free but did have some discomfort with inversion. Pt was able to maintain SL stance with reaching and maintained balance well. Pt continues to progress towards goals established in the POC and should continue with skilled therapy.       Plan: Treatment/Interventions: Education/ Instruction, Manual therapy, Neuromuscular re-education, Therapeutic activities, Therapeutic exercises      Goals:       Zaheer Johns, PT

## 2024-05-06 DIAGNOSIS — J30.89 OTHER ALLERGIC RHINITIS: ICD-10-CM

## 2024-05-06 DIAGNOSIS — R09.89 THROAT CLEARING: ICD-10-CM

## 2024-05-08 RX ORDER — LEVOCETIRIZINE DIHYDROCHLORIDE 5 MG/1
5 TABLET, FILM COATED ORAL EVERY MORNING
Qty: 90 TABLET | Refills: 3 | Status: SHIPPED | OUTPATIENT
Start: 2024-05-08

## 2024-05-08 NOTE — TELEPHONE ENCOUNTER
Refill passed per [facility] protocol.   Requested Prescriptions   Pending Prescriptions Disp Refills    LEVOCETIRIZINE 5 MG Oral Tab [Pharmacy Med Name: Levocetirizine Dihydrochloride 5 Mg Tab Scie] 90 tablet 0     Sig: TAKE 1 TABLET BY MOUTH EVERY MORNING       Allergy Medication Protocol Passed - 5/6/2024  4:34 PM        Passed - In person appointment or virtual visit in the past 12 mos or appointment in next 3 mos     Recent Outpatient Visits              8 months [facility]    1 [facility]    [facility] Visit    1 year ago Age-related osteoporosis without current pathological fracture    [facility]    Office Visit    1 year ago [facility] current pathological fracture    [facility]    [facility] Visit    1 year ago Colon cancer screening    [facility], Mis[facility] Rivera MD    Office Visit                          Recent Outpatient Visits              8 months ago Adult general medical exam    [facility] Angel Ramirez,     Office Visit    1 year ago Throat clearing    [facility] Angel Collazo,     Office Visit    1 year ago Age-related osteoporosis without current pathological fracture    [facility]    Office Visit    1 year ago Age-related osteoporosis without current pathological fracture    [facility]    [facility] Visit    1 year ago Colon cancer screening    [facility], Misael Rivera MD    Office Visit

## 2024-06-03 ENCOUNTER — TELEPHONE (OUTPATIENT)
Dept: FAMILY MEDICINE CLINIC | Facility: CLINIC | Age: 69
End: 2024-06-03

## 2024-06-03 NOTE — TELEPHONE ENCOUNTER
Call made in attempt to schedule apt for physical medicare no answer, please assist if call is returned

## 2024-06-20 ENCOUNTER — TELEPHONE (OUTPATIENT)
Dept: FAMILY MEDICINE CLINIC | Facility: CLINIC | Age: 69
End: 2024-06-20

## 2024-09-12 ENCOUNTER — LAB ENCOUNTER (OUTPATIENT)
Dept: LAB | Age: 69
End: 2024-09-12
Attending: FAMILY MEDICINE
Payer: MEDICARE

## 2024-09-12 ENCOUNTER — OFFICE VISIT (OUTPATIENT)
Dept: FAMILY MEDICINE CLINIC | Facility: CLINIC | Age: 69
End: 2024-09-12
Payer: MEDICARE

## 2024-09-12 VITALS
TEMPERATURE: 97 F | BODY MASS INDEX: 20.21 KG/M2 | WEIGHT: 118.38 LBS | HEIGHT: 64 IN | HEART RATE: 69 BPM | SYSTOLIC BLOOD PRESSURE: 130 MMHG | DIASTOLIC BLOOD PRESSURE: 82 MMHG

## 2024-09-12 DIAGNOSIS — E78.2 MIXED HYPERLIPIDEMIA: ICD-10-CM

## 2024-09-12 DIAGNOSIS — E55.9 VITAMIN D DEFICIENCY: ICD-10-CM

## 2024-09-12 DIAGNOSIS — R79.89 ELEVATED TSH: ICD-10-CM

## 2024-09-12 DIAGNOSIS — K21.9 GASTROESOPHAGEAL REFLUX DISEASE, UNSPECIFIED WHETHER ESOPHAGITIS PRESENT: ICD-10-CM

## 2024-09-12 DIAGNOSIS — M81.0 AGE-RELATED OSTEOPOROSIS WITHOUT CURRENT PATHOLOGICAL FRACTURE: ICD-10-CM

## 2024-09-12 DIAGNOSIS — Z00.00 ENCOUNTER FOR ANNUAL HEALTH EXAMINATION: ICD-10-CM

## 2024-09-12 DIAGNOSIS — Z12.4 CERVICAL CANCER SCREENING: ICD-10-CM

## 2024-09-12 DIAGNOSIS — J30.89 OTHER ALLERGIC RHINITIS: ICD-10-CM

## 2024-09-12 DIAGNOSIS — Z00.00 ADULT GENERAL MEDICAL EXAM: Primary | ICD-10-CM

## 2024-09-12 PROBLEM — L82.1 SK (SEBORRHEIC KERATOSIS): Status: RESOLVED | Noted: 2020-02-18 | Resolved: 2024-09-12

## 2024-09-12 PROBLEM — L21.9 SEBORRHEIC DERMATITIS OF SCALP: Status: RESOLVED | Noted: 2020-02-18 | Resolved: 2024-09-12

## 2024-09-12 PROBLEM — J44.89 ASTHMA WITH COPD (CHRONIC OBSTRUCTIVE PULMONARY DISEASE) (HCC): Chronic | Status: RESOLVED | Noted: 2023-09-09 | Resolved: 2024-09-12

## 2024-09-12 LAB
ALBUMIN SERPL-MCNC: 4.7 G/DL (ref 3.2–4.8)
ALBUMIN/GLOB SERPL: 1.4 {RATIO} (ref 1–2)
ALP LIVER SERPL-CCNC: 77 U/L
ALT SERPL-CCNC: 15 U/L
ANION GAP SERPL CALC-SCNC: 9 MMOL/L (ref 0–18)
AST SERPL-CCNC: 23 U/L (ref ?–34)
BASOPHILS # BLD AUTO: 0.06 X10(3) UL (ref 0–0.2)
BASOPHILS NFR BLD AUTO: 0.6 %
BILIRUB SERPL-MCNC: 0.7 MG/DL (ref 0.2–1.1)
BUN BLD-MCNC: 15 MG/DL (ref 9–23)
BUN/CREAT SERPL: 16 (ref 10–20)
CALCIUM BLD-MCNC: 9.9 MG/DL (ref 8.7–10.4)
CHLORIDE SERPL-SCNC: 102 MMOL/L (ref 98–112)
CHOLEST SERPL-MCNC: 134 MG/DL (ref ?–200)
CO2 SERPL-SCNC: 27 MMOL/L (ref 21–32)
CREAT BLD-MCNC: 0.94 MG/DL
DEPRECATED RDW RBC AUTO: 42.4 FL (ref 35.1–46.3)
EGFRCR SERPLBLD CKD-EPI 2021: 66 ML/MIN/1.73M2 (ref 60–?)
EOSINOPHIL # BLD AUTO: 0.17 X10(3) UL (ref 0–0.7)
EOSINOPHIL NFR BLD AUTO: 1.8 %
ERYTHROCYTE [DISTWIDTH] IN BLOOD BY AUTOMATED COUNT: 12.7 % (ref 11–15)
FASTING PATIENT LIPID ANSWER: YES
FASTING STATUS PATIENT QL REPORTED: YES
GLOBULIN PLAS-MCNC: 3.3 G/DL (ref 2–3.5)
GLUCOSE BLD-MCNC: 88 MG/DL (ref 70–99)
HCT VFR BLD AUTO: 44.6 %
HDLC SERPL-MCNC: 58 MG/DL (ref 40–59)
HGB BLD-MCNC: 14.5 G/DL
IMM GRANULOCYTES # BLD AUTO: 0.02 X10(3) UL (ref 0–1)
IMM GRANULOCYTES NFR BLD: 0.2 %
LDLC SERPL CALC-MCNC: 61 MG/DL (ref ?–100)
LYMPHOCYTES # BLD AUTO: 1.01 X10(3) UL (ref 1–4)
LYMPHOCYTES NFR BLD AUTO: 10.7 %
MCH RBC QN AUTO: 29.7 PG (ref 26–34)
MCHC RBC AUTO-ENTMCNC: 32.5 G/DL (ref 31–37)
MCV RBC AUTO: 91.2 FL
MONOCYTES # BLD AUTO: 0.5 X10(3) UL (ref 0.1–1)
MONOCYTES NFR BLD AUTO: 5.3 %
NEUTROPHILS # BLD AUTO: 7.71 X10 (3) UL (ref 1.5–7.7)
NEUTROPHILS # BLD AUTO: 7.71 X10(3) UL (ref 1.5–7.7)
NEUTROPHILS NFR BLD AUTO: 81.4 %
NONHDLC SERPL-MCNC: 76 MG/DL (ref ?–130)
OSMOLALITY SERPL CALC.SUM OF ELEC: 286 MOSM/KG (ref 275–295)
PLATELET # BLD AUTO: 272 10(3)UL (ref 150–450)
POTASSIUM SERPL-SCNC: 4.2 MMOL/L (ref 3.5–5.1)
PROT SERPL-MCNC: 8 G/DL (ref 5.7–8.2)
RBC # BLD AUTO: 4.89 X10(6)UL
SODIUM SERPL-SCNC: 138 MMOL/L (ref 136–145)
THYROPEROXIDASE AB SERPL-ACNC: 34 U/ML (ref ?–60)
TRIGL SERPL-MCNC: 74 MG/DL (ref 30–149)
TSI SER-ACNC: 2.76 MIU/ML (ref 0.55–4.78)
VIT D+METAB SERPL-MCNC: 78.2 NG/ML (ref 30–100)
VLDLC SERPL CALC-MCNC: 11 MG/DL (ref 0–30)
WBC # BLD AUTO: 9.5 X10(3) UL (ref 4–11)

## 2024-09-12 PROCEDURE — 36415 COLL VENOUS BLD VENIPUNCTURE: CPT

## 2024-09-12 PROCEDURE — 86376 MICROSOMAL ANTIBODY EACH: CPT

## 2024-09-12 PROCEDURE — 84443 ASSAY THYROID STIM HORMONE: CPT

## 2024-09-12 PROCEDURE — 80053 COMPREHEN METABOLIC PANEL: CPT

## 2024-09-12 PROCEDURE — 82306 VITAMIN D 25 HYDROXY: CPT

## 2024-09-12 PROCEDURE — 80061 LIPID PANEL: CPT

## 2024-09-12 PROCEDURE — 85025 COMPLETE CBC W/AUTO DIFF WBC: CPT

## 2024-09-12 RX ORDER — ATORVASTATIN CALCIUM 20 MG/1
20 TABLET, FILM COATED ORAL NIGHTLY
Qty: 90 TABLET | Refills: 3 | Status: SHIPPED | OUTPATIENT
Start: 2024-09-12

## 2024-09-12 NOTE — PROGRESS NOTES
Subjective:   Helga Rodriguez is a 69 year old female who presents for a MA AHA (Medicare Advantage Annual Health Assessment) and scheduled follow up of multiple significant but stable problems.     She does take Caltrate D twice daily.  She still works at [employer] and does not smoke.  She states she feels quite good for the most part.  She needs another bone density scan as it is 2 years now.  She saw gynecology but it was in 2020.    History/Other:   Fall Risk Assessment:   She has been screened for Falls and is low risk.      Cognitive Assessment:   She had a completely normal cognitive assessment - see flowsheet entries     Functional Ability/Status:   Helga Rodriguez has some abnormal functions as listed below:  She has Vision problems based on screening of functional status.       Depression Screening (PHQ):  PHQ-2 SCORE: 0  , done 9/12/2024             Advanced Directives:   She does NOT have a Living Will. [Do you have a living will?: No]  She does NOT have a Power of  for Health Care. [Do you have a healthcare power of ?: No]  Discussed Advance Care Planning with patient (and family/surrogate if present). Standard forms made available to patient in After Visit Summary.      Patient Active Problem List   Diagnosis    History of total vaginal hysterectomy    Spider vein of lower extremity    Poor dentition    Mixed hyperlipidemia    Age-related osteoporosis without current pathological fracture    Colon cancer screening     Allergies:  She has No Known Allergies.    Current Medications:  Outpatient Medications Marked as Taking for the 9/12/24 encounter (Office Visit) with Angel Collazo, DO   Medication Sig    levocetirizine 5 MG Oral Tab Take 1 tablet (5 mg total) by mouth every morning.    famotidine 20 MG Oral Tab Take 1 tablet (20 mg total) by mouth 2 (two) times daily as needed for Heartburn.    atorvastatin 20 MG Oral Tab Take 1 tablet (20 mg total) by mouth nightly.    Multiple  Vitamins-Minerals (MULTIVITAMIN ADULT OR) Take 1 tablet by mouth daily.    Calcium Carbonate 600 MG Oral Tab Take 1 tablet (600 mg total) by mouth 2 (two) times daily.       Medical History:  She  has a past medical history of Extrinsic asthma, unspecified, High cholesterol, Pelvic relaxation, and Urine incontinence.  Surgical History:  She  has a past surgical history that includes ; tonsillectomy; hysterectomy (); and colonoscopy screening - referral (N/A, 2022).   Family History:  Her family history includes Cancer in her maternal uncle and mother; Heart Disease in her father and paternal uncle; Hypertension in her mother; Other in her mother.  Social History:  She  reports that she has quit smoking. Her smoking use included cigarettes. She has quit using smokeless tobacco. She reports that she does not currently use alcohol. She reports that she does not use drugs.    Tobacco:  She smoked tobacco in the past but quit greater than 12 months ago.  Social History     Tobacco Use   Smoking Status Former    Types: Cigarettes   Smokeless Tobacco Former        CAGE Alcohol Screen:   CAGE screening score of 0 on 2024, showing low risk of alcohol abuse.      Patient Care Team:  Angel Collazo DO as PCP - General (Family Medicine)    Review of Systems  She denies any chest pain, shortness of breath, abdominal discomfort.    Objective:   Physical Exam  Physical Exam   Constitutional: She is oriented to person, place, and time. She appears well-developed and well-nourished. No distress.  HEENT:   Head: Normocephalic.   Right Ear: Tympanic membrane and ear canal normal.   Left Ear: Tympanic membrane and ear canal normal.   Mouth/Throat: Oropharynx is clear and moist and mucous membranes are normal.  Poor dentition but no infection seen.  Eyes: Conjunctivae and EOM are normal. Pupils are equal, round, and reactive to light.   Neck: Normal range of motion. Neck supple. No thyromegaly present.    Cardiovascular: Normal rate, regular rhythm and no murmur heard.  Pulmonary/Chest: Effort normal and breath sounds normal. No respiratory distress.   Abdominal: Soft. Bowel sounds are normal. There is no hepatosplenomegaly. There is no tenderness.   Lymphadenopathy:     She has no  cervical adenopathy.   Neurological: She has normal reflexes. No cranial nerve deficit.   Skin: Skin is warm and dry. No rash noted.   Lower legs: No edema of the legs bilaterally  Psychiatric: She has a normal mood and affect     Vitals reviewed.     /82   Pulse 69   Temp 97.3 °F (36.3 °C) (Temporal)   Ht 5' 4\" (1.626 m)   Wt 118 lb 6.4 oz (53.7 kg)   BMI 20.32 kg/m²  Estimated body mass index is 20.32 kg/m² as calculated from the following:    Height as of this encounter: 5' 4\" (1.626 m).    Weight as of this encounter: 118 lb 6.4 oz (53.7 kg).    Medicare Hearing Assessment: passed    30    Visual Acuity:   Right Eye Visual Acuity: Corrected Right Eye Chart Acuity: 20/40   Left Eye Visual Acuity: Corrected Left Eye Chart Acuity: 20/40   Both Eyes Visual Acuity: Corrected Both Eyes Chart Acuity: 20/40   Able To Tolerate Visual Acuity: Yes        Assessment & Plan:   Helga Rodriguez is a 69 year old female who presents for a Medicare Assessment.     Diagnoses and all orders for this visit:    Adult general medical exam  She is going to do fasting labs today.    Mixed hyperlipidemia  -     Comp Metabolic Panel (14); Future  -     Lipid Panel; Future  -     Assay, Thyroid Stim Hormone; Future  She is compliant on her atorvastatin  Vitamin D deficiency  -     Vitamin D; Future  Takes Caltrate D twice daily  Age-related osteoporosis without current pathological fracture  -     Vitamin D; Future  -     XR DEXA BONE DENSITOMETRY (CPT=77080); Future  She would do another bone density scan due to osteoporosis  Gastroesophageal reflux disease, unspecified whether esophagitis present  -     CBC With Differential With Platelet;  Future  She has enough famotidine that she takes twice daily and that helps to control her acid reflux  Other allergic rhinitis  Takes over-the-counter antihistamine if needed      Cervical cancer screening  -     OB - INTERNAL    Elevated TSH  -     Thyroid Peroxidase (TPO) AB [E]; Future  She has had an elevated TSH in the past and we will recheck this along with a TPO antibody  Encounter for annual health examination        Referrals (if applicable)  Orders Placed This Encounter   Procedures    [facility]     ALSO WORKS AT [employer]     Referral Priority:   Routine     Referral Type:   OFFICE VISIT     Referred to Provider:   Emeli Booth MD     Requested Specialty:   OBSTETRICS & GYNECOLOGY     Number of Visits Requested:   3         Follow up if symptoms persist.  Take medicine (if given) as prescribed.  Approach to treatment discussed and patient/family member understands and agrees to plan.     No follow-ups on file.    The patient indicates understanding of these issues and agrees to the plan.  Reinforced healthy diet, lifestyle, and exercise.    No follow-ups on file.     Angel Collazo DO, 9/12/2024     Supplementary Documentation:   General Health:  In the past six months, have you lost more than 10 pounds without trying?: 2 - No  Has your appetite been poor?: No  Type of Diet: Balanced  How does the patient maintain a good energy level?: Appropriate Exercise;Daily Walks  How would you describe your daily physical activity?: Moderate  How would you describe your current health state?: Good  How do you maintain positive mental well-being?: Social Interaction;Games;Visiting Friends;Visiting Family  On a scale of 0 to 10, with 0 being no pain and 10 being severe pain, what is your pain level?: 0 - (None)  In the past six months, have you experienced urine leakage?: 0-No  At any time do you feel concerned for the safety/well-being of yourself and/or your children, in your home or elsewhere?:  Yes  Have you had any immunizations at another office such as Influenza, Hepatitis B, Tetanus, or Pneumococcal?: No    Health Maintenance   Topic Date Due    Zoster Vaccines (1 of 2) Never done    MA Annual Health Assessment  01/01/2024    COVID-19 Vaccine (3 - 2023-24 season) 09/01/2024    Influenza Vaccine (1) 10/01/2024    Mammogram  02/21/2025    Colorectal Cancer Screening  12/30/2029    DEXA Scan  Completed    Annual Depression Screening  Completed    Fall Risk Screening (Annual)  Completed    Pneumococcal Vaccine: 65+ Years  Completed

## 2024-10-22 ENCOUNTER — HOSPITAL ENCOUNTER (OUTPATIENT)
Dept: BONE DENSITY | Age: 69
Discharge: HOME | End: 2024-10-22
Attending: FAMILY MEDICINE
Payer: MEDICARE

## 2024-10-22 DIAGNOSIS — M81.0 AGE-RELATED OSTEOPOROSIS WITHOUT CURRENT PATHOLOGICAL FRACTURE: Primary | ICD-10-CM

## 2024-10-22 DIAGNOSIS — M81.0 AGE-RELATED OSTEOPOROSIS WITHOUT CURRENT PATHOLOGICAL FRACTURE: ICD-10-CM

## 2024-10-22 PROCEDURE — 77080 DXA BONE DENSITY AXIAL: CPT | Performed by: FAMILY MEDICINE

## 2024-11-14 ENCOUNTER — TELEPHONE (OUTPATIENT)
Dept: OBGYN CLINIC | Facility: CLINIC | Age: 69
End: 2024-11-14

## 2024-11-14 ENCOUNTER — OFFICE VISIT (OUTPATIENT)
Dept: OBGYN CLINIC | Facility: CLINIC | Age: 69
End: 2024-11-14
Payer: MEDICARE

## 2024-11-14 VITALS
BODY MASS INDEX: 20.2 KG/M2 | HEART RATE: 75 BPM | WEIGHT: 114 LBS | SYSTOLIC BLOOD PRESSURE: 143 MMHG | HEIGHT: 63 IN | DIASTOLIC BLOOD PRESSURE: 76 MMHG

## 2024-11-14 DIAGNOSIS — Z12.31 VISIT FOR SCREENING MAMMOGRAM: ICD-10-CM

## 2024-11-14 DIAGNOSIS — N81.11 CYSTOCELE, MIDLINE: ICD-10-CM

## 2024-11-14 DIAGNOSIS — Z01.411 ENCOUNTER FOR GYNECOLOGICAL EXAMINATION WITH ABNORMAL FINDING: Primary | ICD-10-CM

## 2024-11-14 PROBLEM — J44.89 ASTHMA WITH COPD (CHRONIC OBSTRUCTIVE PULMONARY DISEASE) (HCC): Chronic | Status: ACTIVE | Noted: 2024-11-14

## 2024-11-14 PROCEDURE — 1159F MED LIST DOCD IN RCRD: CPT | Performed by: OBSTETRICS & GYNECOLOGY

## 2024-11-14 PROCEDURE — 99202 OFFICE O/P NEW SF 15 MIN: CPT | Performed by: OBSTETRICS & GYNECOLOGY

## 2024-11-14 PROCEDURE — 3078F DIAST BP <80 MM HG: CPT | Performed by: OBSTETRICS & GYNECOLOGY

## 2024-11-14 PROCEDURE — 1160F RVW MEDS BY RX/DR IN RCRD: CPT | Performed by: OBSTETRICS & GYNECOLOGY

## 2024-11-14 PROCEDURE — 3077F SYST BP >= 140 MM HG: CPT | Performed by: OBSTETRICS & GYNECOLOGY

## 2024-11-14 PROCEDURE — G0101 CA SCREEN;PELVIC/BREAST EXAM: HCPCS | Performed by: OBSTETRICS & GYNECOLOGY

## 2024-11-14 PROCEDURE — 3008F BODY MASS INDEX DOCD: CPT | Performed by: OBSTETRICS & GYNECOLOGY

## 2024-11-14 NOTE — TELEPHONE ENCOUNTER
Patient asking for mammogram order, patient did make an appointment for February in [place].    Pls advise when order is available so she knows it's in place.

## 2024-11-14 NOTE — PROGRESS NOTES
Helga Rodriguez is a 69 year old female  No LMP recorded. Patient has had a hysterectomy.   Chief Complaint   Patient presents with    Gyn Exam     ANNUAL EXAM -- last seen in . No issues   .  She has no complaints.  Denies postmenopausal bleeding, urinary or sexual issues.      OBSTETRICS HISTORY:     OB History    Para Term  AB Living   2 2 2 0 0 2   SAB IAB Ectopic Multiple Live Births   0 0 0 0 2      # Outcome Date GA Lbr Messi/2nd Weight Sex Type Anes PTL Lv   2 Term     M NORMAL SPONT   RENARD   1 Term     M NORMAL SPONT   RENARD       GYNE HISTORY:     Periods none due to hysterectomy         No data to display                  MEDICAL HISTORY:     Past Medical History:    Extrinsic asthma, unspecified    per [employer]:  \"asthma as a child\"    High cholesterol    Pelvic relaxation    Urine incontinence     Past Surgical History:   Procedure Laterality Date    Colonoscopy screening - referral N/A 2022    Procedure: COLONOSCOPY-SCREENING;  Surgeon: Misael Conley MD;  Location: [facility] ENDOSCOPY    Hysterectomy      TVH/BSO/bladder surgery w/mesh          x2    Tonsillectomy       OB History    Para Term  AB Living   2 2 2 0 0 2   SAB IAB Ectopic Multiple Live Births   0 0 0 0 2        SOCIAL HISTORY:     Social History     Socioeconomic History    Marital status:      Spouse name: Not on file    Number of children: Not on file    Years of education: Not on file    Highest education level: Not on file   Occupational History    Not on file   Tobacco Use    Smoking status: Former     Types: Cigarettes    Smokeless tobacco: Former   Vaping Use    Vaping status: Never Used   Substance and Sexual Activity    Alcohol use: Not Currently     Alcohol/week: 0.0 standard drinks of alcohol     Comment: (Wine) 2 glasses, rarely    Drug use: No    Sexual activity: Not on file   Other Topics Concern     Service Not Asked    Blood Transfusions Not Asked    Caffeine  Concern Yes     Comment: (Coffee, soda) 2 cups daily    Occupational Exposure Not Asked    Hobby Hazards Not Asked    Sleep Concern Not Asked    Stress Concern Not Asked    Weight Concern Not Asked    Special Diet Not Asked    Back Care Not Asked    Exercise Not Asked    Bike Helmet Not Asked    Seat Belt Not Asked    Self-Exams Not Asked   Social History Narrative    Not on file     Social Drivers of Health     Financial Resource Strain: Not on file   Food Insecurity: Not on file   Transportation Needs: Not on file   Physical Activity: Not on file   Stress: Not on file   Social Connections: Not on file   Housing Stability: Not on file       FAMILY HISTORY:     Family History   Problem Relation Age of Onset    Cancer Mother         Cancer - leukemia    Hypertension Mother     Other (Other) Mother     Heart Disease Father         CAD    Cancer Maternal Uncle         Cancer - Leukemia    Heart Disease Paternal Uncle         CAD       MEDICATIONS:       Current Outpatient Medications:     atorvastatin 20 MG Oral Tab, Take 1 tablet (20 mg total) by mouth nightly., Disp: 90 tablet, Rfl: 3    levocetirizine 5 MG Oral Tab, Take 1 tablet (5 mg total) by mouth every morning., Disp: 90 tablet, Rfl: 3    famotidine 20 MG Oral Tab, Take 1 tablet (20 mg total) by mouth 2 (two) times daily as needed for Heartburn., Disp: 180 tablet, Rfl: 3    Multiple Vitamins-Minerals (MULTIVITAMIN ADULT OR), Take 1 tablet by mouth daily., Disp: , Rfl:     Calcium Carbonate 600 MG Oral Tab, Take 1 tablet (600 mg total) by mouth 2 (two) times daily., Disp: , Rfl:     ALLERGIES:     Allergies[1]      REVIEW OF SYSTEMS:     Constitutional:    denies fever / chills  Eyes:     denies blurred or double vision  Cardiovascular:  denies chest pain or palpitations  Respiratory:    denies shortness of breath  Gastrointestinal:  denies severe abdominal pain, frequent diarrhea or constipation, nausea / vomiting  Genitourinary:    denies dysuria, bothersome  incontinence  Skin/Breast:   denies any breast pain, lumps, or discharge  Neurological:    denies frequent severe headaches  Psychiatric:   denies depression or anxiety, thoughts of harming self or others  Heme/Lymph:    denies easy bruising or bleeding    PHYSICAL EXAM:   Blood pressure 143/76, pulse 75, height 5' 3\" (1.6 m), weight 114 lb (51.7 kg), not currently breastfeeding.  Constitutional:  well developed, well nourished  Head/Face:  normocephalic  Neck/Thyroid:  thyroid symmetric, no thyromegaly, no nodules, no adenopathy  Lymphatic: no abnormal supraclavicular or axillary adenopathy is noted  Breast:   normal without palpable masses, tenderness, asymmetry, nipple discharge, nipple retraction or skin changes  Respiratory:   nonlabored breathing  Cardiovascular: regular rate and rhythm  Abdomen:   soft, nontender, nondistended, no masses  Skin/Hair: no unusual rashes or bruises  Extremities:  no edema, no cyanosis  Psychiatric:   Oriented to time, place, person and situation. Appropriate mood and affect    Pelvic Exam:  External Genitalia:  normal appearance, hair distribution, and no lesions  Urethral Meatus:   normal in size, location, without lesions and prolapse  Bladder:    no fullness, masses or tenderness  Vagina:    normal appearance without lesions, no abnormal discharge (+) grade 4 cystocele  Cervix:    absent  Uterus:    absent  Adnexa:   absent  Perineum:   normal  Anus:    no hemorroids    ASSESSMENT & PLAN:     Helga was seen today for gyn exam.    Diagnoses and all orders for this visit:    Encounter for gynecological examination with abnormal finding    Cystocele, midline    Visit for screening mammogram  -     Mammogram Screen 3D Digital Bilateral; Future      Needs either pessary or surgery for significant cystocele -- grade 4 - bulges about 4 cm out of introitus .  Wishes for pessary -- follow up at main office for pessary fitting    SUMMARY:    Pap: No more paps per ASCCP  guidelines.    Mammogram: ordered placed    BCM: Hysterectomy    STD screening: declines    Colon cancer screening: UTD    Misc: Calcium needs reviewed (1500 mg diet + supplement). Weight bearing exercise encouraged. Call if any VB (if perimenopausal, reviewed abn VB patterns)    HM updated    Depression screen:   Depression Screening (PHQ-2/PHQ-9): Over the LAST 2 WEEKS   Little interest or pleasure in doing things (over the last two weeks)?: Not at all    Feeling down, depressed, or hopeless (over the last two weeks)?: Not at all    PHQ-2 SCORE: 0          FOLLOW-UP     No follow-ups on file.    Note to patient and family:  The 21st Century Cures Act makes medical notes available to patients in the interest of transparency.  However, please be advised that this is a medical document.  It is intended as a peer to peer communication.  It is written in medical language and may contain abbreviations or verbiage that are technical and unfamiliar.  It may appear blunt or direct.  Medical documents are intended to carry relevant information, facts as evident, and the clinical opinion of the practitioner.       [1] Not on File

## 2025-01-24 ENCOUNTER — TELEPHONE (OUTPATIENT)
Dept: OBGYN CLINIC | Facility: CLINIC | Age: 70
End: 2025-01-24

## 2025-01-24 NOTE — TELEPHONE ENCOUNTER
Patient calling regarding pessary appointment coming up. Asking for clarification on the appointment, wants to confirm Dr. Booth is fitting her for a pessary and that it will stay in and then she will return for a follow up appointment to have it checked and cleaned. Patient informed yes, Dr. Booth will do a pessary fitting. Place one that is appropriate, she will go home with it in and then follow up per Dr. Booth recommendations. Patient states understanding.

## 2025-02-22 ENCOUNTER — HOSPITAL ENCOUNTER (OUTPATIENT)
Dept: MAMMOGRAPHY | Age: 70
Discharge: HOME | End: 2025-02-22
Attending: OBSTETRICS & GYNECOLOGY
Payer: MEDICARE

## 2025-02-22 DIAGNOSIS — Z12.31 VISIT FOR SCREENING MAMMOGRAM: ICD-10-CM

## 2025-02-22 PROCEDURE — 77067 SCR MAMMO BI INCL CAD: CPT | Performed by: OBSTETRICS & GYNECOLOGY

## 2025-02-22 PROCEDURE — 77063 BREAST TOMOSYNTHESIS BI: CPT | Performed by: OBSTETRICS & GYNECOLOGY

## 2025-02-25 ENCOUNTER — TELEPHONE (OUTPATIENT)
Dept: OBGYN CLINIC | Facility: CLINIC | Age: 70
End: 2025-02-25

## 2025-02-25 ENCOUNTER — OFFICE VISIT (OUTPATIENT)
Dept: OBGYN CLINIC | Facility: CLINIC | Age: 70
End: 2025-02-25
Payer: MEDICARE

## 2025-02-25 VITALS
DIASTOLIC BLOOD PRESSURE: 79 MMHG | SYSTOLIC BLOOD PRESSURE: 121 MMHG | HEART RATE: 61 BPM | WEIGHT: 109.38 LBS | BODY MASS INDEX: 19 KG/M2

## 2025-02-25 DIAGNOSIS — N81.11 CYSTOCELE, MIDLINE: Primary | ICD-10-CM

## 2025-02-25 PROCEDURE — 1159F MED LIST DOCD IN RCRD: CPT | Performed by: OBSTETRICS & GYNECOLOGY

## 2025-02-25 PROCEDURE — 3074F SYST BP LT 130 MM HG: CPT | Performed by: OBSTETRICS & GYNECOLOGY

## 2025-02-25 PROCEDURE — 57160 INSERT PESSARY/OTHER DEVICE: CPT | Performed by: OBSTETRICS & GYNECOLOGY

## 2025-02-25 PROCEDURE — A4562 PESSARY, NON RUBBER,ANY TYPE: HCPCS | Performed by: OBSTETRICS & GYNECOLOGY

## 2025-02-25 PROCEDURE — 1160F RVW MEDS BY RX/DR IN RCRD: CPT | Performed by: OBSTETRICS & GYNECOLOGY

## 2025-02-25 PROCEDURE — 3078F DIAST BP <80 MM HG: CPT | Performed by: OBSTETRICS & GYNECOLOGY

## 2025-02-25 NOTE — PROCEDURES
Pessary -- initial fitting      Consent signed.  Procedure discussed with patient in detail including indication, risk, benefits, alternatives and complications.    Findings:   Rectocele Grade 2  Cystocele Grade 3  Enterocele Grade 0  Uterine Prolapse Grade 0    Procedure:      multiple different sizing rings used to find maximize size of pessary that is comfortable for patient & would not fall out with typically movement / valsalva. Then sizing ring converted to listed pessary type / size.    Pessary Type: Incontinence dish with support  Size: 4        Plan:  Trimosan 2X/week  Pt will contact us if any issues.  Used pessary in past w/ self cleaning

## 2025-02-25 NOTE — TELEPHONE ENCOUNTER
Message back to [place] to clarify med order. There are two options in epic. Please advise:     Trimo-Guerrier 0.025 gel or Trimo-Guerrier 0.025/0.1%.

## 2025-02-25 NOTE — PROGRESS NOTES
Helga Rodriguez is a 69 year old female  No LMP recorded. Patient has had a hysterectomy.   Chief Complaint   Patient presents with    Gyn Exam     Pessary fitting   .  History of Present Illness  Helga Rodriguez is a 69 year old female with pelvic organ prolapse who presents for pessary fitting.    She has pelvic organ prolapse characterized by a bulging bladder into the vaginal canal. She previously attempted to manage this condition with a pessary independently, but faced challenges as it would dislodge during urination, leading to discontinuation.    During the current visit, various pessary sizes and shapes were trialed to find a suitable fit that remains in place during activities such as standing, coughing, or squatting. A prior attempt with a smaller pessary resulted in immediate leakage, necessitating a different type or size.      OBSTETRICS HISTORY:  OB History    Para Term  AB Living   2 2 2 0 0 2   SAB IAB Ectopic Multiple Live Births   0 0 0 0 2       GYNE HISTORY:  Periods absent    History   Sexual Activity    Sexual activity: Not on file       MEDICAL HISTORY:  Past Medical History:    Extrinsic asthma, unspecified    per [employer]:  \"asthma as a child\"    High cholesterol    Pelvic relaxation    Urine incontinence     Past Surgical History:   Procedure Laterality Date    Colonoscopy screening - referral N/A 2022    Procedure: COLONOSCOPY-SCREENING;  Surgeon: Misael Conley MD;  Location: [facility]COPY    Hysterectomy      TVH/BSO/bladder surgery w/mesh          x2    Tonsillectomy       OB History    Para Term  AB Living   2 2 2 0 0 2   SAB IAB Ectopic Multiple Live Births   0 0 0 0 2        SOCIAL HISTORY:  Social History     Socioeconomic History    Marital status:      Spouse name: Not on file    Number of children: Not on file    Years of education: Not on file    Highest education level: Not on file   Occupational History    Not on  file   Tobacco Use    Smoking status: Former     Types: Cigarettes    Smokeless tobacco: Former   Vaping Use    Vaping status: Never Used   Substance and Sexual Activity    Alcohol use: Not Currently     Alcohol/week: 0.0 standard drinks of alcohol     Comment: (Wine) 2 glasses, rarely    Drug use: No    Sexual activity: Not on file   Other Topics Concern     Service Not Asked    Blood Transfusions Not Asked    Caffeine Concern Yes     Comment: (Coffee, soda) 2 cups daily    Occupational Exposure Not Asked    Hobby Hazards Not Asked    Sleep Concern Not Asked    Stress Concern Not Asked    Weight Concern Not Asked    Special Diet Not Asked    Back Care Not Asked    Exercise Not Asked    Bike Helmet Not Asked    Seat Belt Not Asked    Self-Exams Not Asked   Social History Narrative    Not on file     Social Drivers of Health     Food Insecurity: Not on file   Transportation Needs: Not on file   Stress: Not on file   Housing Stability: Not on file       MEDICATIONS:    Current Outpatient Medications:     atorvastatin 20 MG Oral Tab, Take 1 tablet (20 mg total) by mouth nightly., Disp: 90 tablet, Rfl: 3    levocetirizine 5 MG Oral Tab, Take 1 tablet (5 mg total) by mouth every morning., Disp: 90 tablet, Rfl: 3    famotidine 20 MG Oral Tab, Take 1 tablet (20 mg total) by mouth 2 (two) times daily as needed for Heartburn., Disp: 180 tablet, Rfl: 3    Multiple Vitamins-Minerals (MULTIVITAMIN ADULT OR), Take 1 tablet by mouth daily., Disp: , Rfl:     Calcium Carbonate 600 MG Oral Tab, Take 1 tablet (600 mg total) by mouth 2 (two) times daily., Disp: , Rfl:     ALLERGIES:  Allergies[1]      Review of Systems:  Constitutional:    denies fatigue, night sweats, hot flashes  Gastrointestinal:  denies abdominal pain, diarrhea or constipation  Genitourinary:    denies dysuria, abnormal vaginal discharge, vaginal itching  Musculoskeletal:   denies back pain.  Neurological:    denies headaches, extremity weakness or  numbness.  Psychiatric:   denies depression or anxiety.        PHYSICAL EXAM:   /79   Pulse 61   Wt 109 lb 6.4 oz (49.6 kg)   BMI 19.38 kg/m²   Constitutional:  well developed, well nourished  Head/Face: normocephalic  Psychiatric:   oriented to time, place, person and situation. Appropriate mood and affect  Gyne:  Grade 3 cystocele     Results  Procedure: Pessary fitting  Description: The pessary was inserted into the vagina. Initial fitting ring #5 was tested, and the patient was asked to stand, wiggle, squat, and cough to check for fit and comfort. The patient reported feeling the pessary, so a smaller size was tried. The incontinence dish #4 was then inserted to prevent leakage. The patient was again asked to stand, wiggle, squat, and cough. The final pessary incontinence dish with a knob was inserted to prevent bladder leakage. The patient reported comfort and no leakage.  Informed Consent: The patient was informed about the purpose of the pessary to support the bladder and prevent prolapse. Risks discussed included potential urine leakage when the pessary is in place. Alternatives such as surgery were mentioned if the pessary does not work. The patient was advised on how to remove and clean the pessary and the importance of avoiding constipation to prevent the pessary from dislodging.    Assessment & Plan:    Helga was seen today for gyn exam.    Diagnoses and all orders for this visit:    Cystocele, midline        Requested Prescriptions      No prescriptions requested or ordered in this encounter     Assessment & Plan  Pelvic Organ Prolapse  Symptomatic cystocele with bladder bulging into the vagina. Previous pessary use unsuccessful due to dislodgement during urination. Goal: find a well-fitting pessary to prevent urinary leakage. Discussed pessary shapes/sizes, fitting importance, and risk of increased urinary leakage due to bladder repositioning. Emphasized avoiding straining with constipation to  prevent dislodgement. Potential need for surgery if pessary use fails.  - Fit and insert a pessary, starting with a standard size and adjusting as needed.  - Try an incontinence dish pessary to prevent urinary leakage.  - Educate on proper insertion and removal techniques.  - Advise to avoid straining with constipation to prevent pessary dislodgement.  - Recommend high fiber diet and adequate hydration to prevent constipation.  - Prescribe Tramison cream: half an applicator three times a week for the first week, then twice a week thereafter.  - Schedule follow-up in 2-3 weeks or sooner if issues arise.      Spent total time 30 minutes on obtaining history / chart review, evaluating patient / performing medically appropriate exam, discussing treatment options, counseling / educating, and completing documentation, coordinating care.                 [1] Not on File

## 2025-02-25 NOTE — TELEPHONE ENCOUNTER
To Dr. Booth patient is calling about cream that Dr. Booth had indicated she wishes for patient to use. She is asking if this is a prescription that will be sent in or OTC.     Noted indicated \"Prescribe Tramison cream: half an applicator three times a week for the first week, then twice a week thereafter.\"    RN unable to find Tramison, but we do have Trimo-Guerrier 0.025 gel or Trimo-Guerrier 0.025/0.1%.     Please advise. Thank you.

## 2025-02-26 RX ORDER — OXYQUINOLINE SULFATE AND SODIUM LAURYL SULFATE .25; .1 MG/G; MG/G
0.5 JELLY VAGINAL AS DIRECTED
Qty: 113.4 G | Refills: 0 | Status: SHIPPED | OUTPATIENT
Start: 2025-02-26

## 2025-02-26 NOTE — TELEPHONE ENCOUNTER
Call placed to pharmacy to discuss- per pharmacist there is only one version of Trimo-chavez which is Trimo-chavez gel 0.025%-0.01%. There is no cream version only gel. The only existing dose for this is 0.025-0.01%.  Prescription sent. Helga notified prescription sent.   To Dr. Booth for sign off.

## 2025-03-03 NOTE — PROGRESS NOTES
Normal mammogram notification via [employer] sent Quality 226: Preventive Care And Screening: Tobacco Use: Screening And Cessation Intervention: Patient screened for tobacco use and is an ex/non-smoker Quality 47: Advance Care Plan: Advance Care Planning discussed and documented; advance care plan or surrogate decision maker documented in the medical record. Detail Level: Detailed

## 2025-03-18 DIAGNOSIS — K21.9 GASTROESOPHAGEAL REFLUX DISEASE, UNSPECIFIED WHETHER ESOPHAGITIS PRESENT: ICD-10-CM

## 2025-03-21 RX ORDER — FAMOTIDINE 20 MG/1
20 TABLET, FILM COATED ORAL 2 TIMES DAILY PRN
Qty: 180 TABLET | Refills: 3 | Status: SHIPPED | OUTPATIENT
Start: 2025-03-21

## 2025-03-21 NOTE — TELEPHONE ENCOUNTER
Patient is calling for status of her medication refill request. Patient can be reached at [phone]. Per the patient she will be out of medication on Jas 3-23-25.

## 2025-05-09 ENCOUNTER — TELEPHONE (OUTPATIENT)
Dept: FAMILY MEDICINE CLINIC | Facility: CLINIC | Age: 70
End: 2025-05-09

## 2025-05-09 DIAGNOSIS — R09.89 THROAT CLEARING: ICD-10-CM

## 2025-05-09 DIAGNOSIS — J30.89 OTHER ALLERGIC RHINITIS: ICD-10-CM

## 2025-05-09 RX ORDER — LEVOCETIRIZINE DIHYDROCHLORIDE 5 MG/1
5 TABLET, FILM COATED ORAL EVERY MORNING
Qty: 90 TABLET | Refills: 3 | Status: SHIPPED | OUTPATIENT
Start: 2025-05-09

## 2025-05-09 NOTE — TELEPHONE ENCOUNTER
Per patient she is out of medication and she needs refill on her Levo cetirizine and Singulair (not in patient current medication list) send to her pharmacy [facility] on file verified.    Current Medications[1]    levocetirizine 5 MG Oral Tab Take 1 tablet (5 mg total) by mouth every morning. 90 tablet 3          [1]   Current Outpatient Medications   Medication Sig Dispense Refill    famotidine 20 MG Oral Tab Take 1 tablet (20 mg total) by mouth 2 (two) times daily as needed for Heartburn. 180 tablet 3    Oxyquinoline-Sod Lauryl Sulf (TRIMO-RECINOS) 0.025-0.01 % Vaginal Gel Place 0.5 applicators vaginally As Directed. 3 times a week for 1 week, THEN 2 times a week thereafter. 113.4 g 0    atorvastatin 20 MG Oral Tab Take 1 tablet (20 mg total) by mouth nightly. 90 tablet 3    levocetirizine 5 MG Oral Tab Take 1 tablet (5 mg total) by mouth every morning. 90 tablet 3    Multiple Vitamins-Minerals (MULTIVITAMIN ADULT OR) Take 1 tablet by mouth daily.      Calcium Carbonate 600 MG Oral Tab Take 1 tablet (600 mg total) by mouth 2 (two) times daily.

## 2025-05-12 RX ORDER — MONTELUKAST SODIUM 10 MG/1
10 TABLET ORAL NIGHTLY
Qty: 90 TABLET | Refills: 0 | Status: SHIPPED | OUTPATIENT
Start: 2025-05-12

## 2025-05-12 NOTE — TELEPHONE ENCOUNTER
Please Review. Protocol Failed; No Protocol     Mycharted patient to schedule an appointment.   Sent to Patient  to call patient to schedule an appointment

## 2025-05-20 ENCOUNTER — TELEPHONE (OUTPATIENT)
Dept: FAMILY MEDICINE CLINIC | Facility: CLINIC | Age: 70
End: 2025-05-20

## 2025-07-23 ENCOUNTER — TELEPHONE (OUTPATIENT)
Age: 70
End: 2025-07-23

## 2025-07-23 NOTE — TELEPHONE ENCOUNTER
[facility] Dr. Machado is no longer taking in Dr. Handley patients. If patient calls, offer f/u slot with Dr. Smalls. Cancel any Dr. Machado consult appts.

## 2025-08-15 ENCOUNTER — TELEPHONE (OUTPATIENT)
Dept: RHEUMATOLOGY | Facility: CLINIC | Age: 70
End: 2025-08-15

## 2025-08-15 ENCOUNTER — LAB ENCOUNTER (OUTPATIENT)
Dept: LAB | Facility: HOSPITAL | Age: 70
End: 2025-08-15
Attending: INTERNAL MEDICINE

## 2025-08-15 ENCOUNTER — OFFICE VISIT (OUTPATIENT)
Age: 70
End: 2025-08-15

## 2025-08-15 VITALS
BODY MASS INDEX: 20.02 KG/M2 | HEIGHT: 63 IN | SYSTOLIC BLOOD PRESSURE: 119 MMHG | WEIGHT: 113 LBS | HEART RATE: 65 BPM | DIASTOLIC BLOOD PRESSURE: 72 MMHG

## 2025-08-15 DIAGNOSIS — M25.50 ARTHRALGIA, UNSPECIFIED JOINT: ICD-10-CM

## 2025-08-15 DIAGNOSIS — M81.0 AGE-RELATED OSTEOPOROSIS WITHOUT CURRENT PATHOLOGICAL FRACTURE: Primary | ICD-10-CM

## 2025-08-15 DIAGNOSIS — M81.0 AGE-RELATED OSTEOPOROSIS WITHOUT CURRENT PATHOLOGICAL FRACTURE: ICD-10-CM

## 2025-08-15 LAB
ALBUMIN SERPL-MCNC: 4.9 G/DL (ref 3.2–4.8)
ALBUMIN/GLOB SERPL: 1.8 (ref 1–2)
ALP LIVER SERPL-CCNC: 71 U/L (ref 55–142)
ALT SERPL-CCNC: 25 U/L (ref 10–49)
ANION GAP SERPL CALC-SCNC: 8 MMOL/L (ref 0–18)
AST SERPL-CCNC: 31 U/L (ref ?–34)
BILIRUB SERPL-MCNC: 0.5 MG/DL (ref 0.2–1.1)
BUN BLD-MCNC: 14 MG/DL (ref 9–23)
BUN/CREAT SERPL: 13.3 (ref 10–20)
CALCIUM BLD-MCNC: 9.8 MG/DL (ref 8.7–10.4)
CHLORIDE SERPL-SCNC: 102 MMOL/L (ref 98–112)
CO2 SERPL-SCNC: 29 MMOL/L (ref 21–32)
CREAT BLD-MCNC: 1.05 MG/DL (ref 0.55–1.02)
EGFRCR SERPLBLD CKD-EPI 2021: 58 ML/MIN/1.73M2 (ref 60–?)
FASTING STATUS PATIENT QL REPORTED: YES
GLOBULIN PLAS-MCNC: 2.7 G/DL (ref 2–3.5)
GLUCOSE BLD-MCNC: 84 MG/DL (ref 70–99)
OSMOLALITY SERPL CALC.SUM OF ELEC: 288 MOSM/KG (ref 275–295)
POTASSIUM SERPL-SCNC: 4 MMOL/L (ref 3.5–5.1)
PROT SERPL-MCNC: 7.6 G/DL (ref 5.7–8.2)
SODIUM SERPL-SCNC: 139 MMOL/L (ref 136–145)

## 2025-08-15 PROCEDURE — 99213 OFFICE O/P EST LOW 20 MIN: CPT | Performed by: INTERNAL MEDICINE

## 2025-08-15 PROCEDURE — 1159F MED LIST DOCD IN RCRD: CPT | Performed by: INTERNAL MEDICINE

## 2025-08-15 PROCEDURE — 80053 COMPREHEN METABOLIC PANEL: CPT

## 2025-08-15 PROCEDURE — 3074F SYST BP LT 130 MM HG: CPT | Performed by: INTERNAL MEDICINE

## 2025-08-15 PROCEDURE — 36415 COLL VENOUS BLD VENIPUNCTURE: CPT

## 2025-08-15 PROCEDURE — 1160F RVW MEDS BY RX/DR IN RCRD: CPT | Performed by: INTERNAL MEDICINE

## 2025-08-15 PROCEDURE — 3008F BODY MASS INDEX DOCD: CPT | Performed by: INTERNAL MEDICINE

## 2025-08-15 PROCEDURE — 1126F AMNT PAIN NOTED NONE PRSNT: CPT | Performed by: INTERNAL MEDICINE

## 2025-08-15 PROCEDURE — G2211 COMPLEX E/M VISIT ADD ON: HCPCS | Performed by: INTERNAL MEDICINE

## 2025-08-15 PROCEDURE — 3078F DIAST BP <80 MM HG: CPT | Performed by: INTERNAL MEDICINE

## 2025-08-18 RX ORDER — ZOLEDRONIC ACID 0.05 MG/ML
5 INJECTION, SOLUTION INTRAVENOUS ONCE
OUTPATIENT
Start: 2025-08-18

## (undated) DEVICE — MEDI-VAC NON-CONDUCTIVE SUCTION TUBING 6MM X 1.8M (6FT.) L: Brand: CARDINAL HEALTH

## (undated) DEVICE — FORCEP RADIAL JAW 4

## (undated) DEVICE — KIT ENDO ORCAPOD 160/180/190

## (undated) DEVICE — Device: Brand: DUAL NARE NASAL CANNULAE FEMALE LUER CON 7FT O2 TUBE

## (undated) DEVICE — KIT CLEAN ENDOKIT 1.1OZ GOWNX2

## (undated) DEVICE — 60 ML SYRINGE REGULAR TIP: Brand: MONOJECT

## (undated) NOTE — LETTER
[address]  [address] [place], [place]  Authorization for Invasive Procedure                                                                                           1. I hereby authorize Marina Brantley MD, my physician and his/her assistants (if applicable), which may include medical students, residents, and/or fellows, to perform the following surgical operation/ procedure and administer such anesthesia as may be determined necessary by my physician: Operation/Procedure name (s) COLONOSCOPY-SCREENING on [address] [place] [address] Road   2. I recognize that during the surgical operation/procedure, unforeseen conditions may necessitate additional or different procedures than those listed above. I, therefore, further authorize and request that the above-named surgeon, assistants, or designees perform such procedures as are, in their judgment, necessary and desirable. 3.   My surgeon/physician has discussed prior to my surgery the potential benefits, risks and side effects of this procedure; the likelihood of achieving goals; and potential problems that might occur during recuperation. They also discussed reasonable alternatives to the procedure, including risks, benefits, and side effects related to the alternatives and risks related to not receiving this procedure. I have had all my questions answered and I acknowledge that no guarantee has been made as to the result that may be obtained. 4.   Should the need arise during my operation/procedure, which includes change of level of care prior to discharge, I also consent to the administration of blood and/or blood products. Further, I understand that despite careful testing and screening of blood or blood products by collecting agencies, I may still be subject to ill effects as a result of receiving a blood transfusion and/or blood products.   The following are some, but not all, of the potential risks that can occur: fever and allergic reactions, hemolytic reactions, transmission of diseases such as Hepatitis, AIDS and Cytomegalovirus (CMV) and fluid overload. In the event that I wish to have an autologous transfusion of my own blood, or a directed donor transfusion, I will discuss this with my physician. Check only if Refusing Blood or Blood Products  I understand refusal of blood or blood products as deemed necessary by my physician may have serious consequences to my condition to include possible death. I hereby assume responsibility for my refusal and release the hospital, its personnel, and my physicians from any responsibility for the consequences of my refusal.    o  Refuse   5. I authorize the use of any specimen, organs, tissues, body parts or foreign objects that may be removed from my body during the operation/procedure for diagnosis, research or teaching purposes and their subsequent disposal by hospital authorities. I also authorize the release of specimen test results and/or written reports to my treating physician on the hospital medical staff or other referring or consulting physicians involved in my care, at the discretion of the Pathologist or my treating physician. 6.   I consent to the photographing or videotaping of the operations or procedures to be performed, including appropriate portions of my body for medical, scientific, or educational purposes, provided my identity is not revealed by the pictures or by descriptive texts accompanying them. If the procedure has been photographed/videotaped, the surgeon will obtain the original picture, image, videotape or CD. The hospital will not be responsible for storage, release or maintenance of the picture, image, tape or CD.    7.   I consent to the presence of a  or observers in the operating room as deemed necessary by my physician or their designees.     8.   I recognize that in the event my procedure results in extended X-Ray/fluoroscopy time, I may develop a skin reaction. 9. If I have a Do Not Attempt Resuscitation (DNAR) order in place, that status will be suspended while in the operating room, procedural suite, and during the recovery period unless otherwise explicitly stated by me (or a person authorized to consent on my behalf). The surgeon or my attending physician will determine when the applicable recovery period ends for purposes of reinstating the DNAR order. 10. Patients having a sterilization procedure: I understand that if the procedure is successful the results will be permanent and it will therefore be impossible for me to inseminate, conceive, or bear children. I also understand that the procedure is intended to result in sterility, although the result has not been guaranteed. 11. I acknowledge that my physician has explained sedation/analgesia administration to me including the risk and benefits I consent to the administration of sedation/analgesia as may be necessary or desirable in the judgment of my physician. I CERTIFY THAT I HAVE READ AND FULLY UNDERSTAND THE ABOVE CONSENT TO OPERATION and/or OTHER PROCEDURE.     _________________________________________ _________________________________     ___________________________________  Signature of Patient     Signature of Responsible Person                   Printed Name of Responsible Person                              _________________________________________ ______________________________        ___________________________________  Signature of Witness         Date  Time         Relationship to Patient    STATEMENT OF PHYSICIAN My signature below affirms that prior to the time of the procedure; I have explained to the patient and/or his/her legal representative, the risks and benefits involved in the proposed treatment and any reasonable alternative to the proposed treatment.  I have also explained the risks and benefits involved in refusal of the proposed treatment and alternatives to the proposed treatment and have answered the patient's questions.  If I have a significant financial interest in a co-management agreement or a significant financial interest in any product or implant, or other significant relationship used in this procedure/surgery, I have disclosed this and had a discussion with my patient.     _______________________________________________________________ _____________________________  Tessa Sanders of Physician)                                                                                         (Date)                                   (Time)  Patient Name: Niranjan Watson Yovanys    : 1955   Printed: 2022      Medical Record #: [id number]                                              Page 1 of 1

## (undated) NOTE — LETTER
12/6/2017              [address]        [place] [address]         To whom it may concern,    Michael Odalys is currently a patient under my medical care. I understand she works at [employer].   She has been diagnos

## (undated) NOTE — LETTER
March 2, 2020         Precious Osei DO  [address] [facility] [address]      Patient: [facility]   YOB: 1955   Date of Visit: 3/2/2020       Dear Dr. Edward Stuart saw your patient, Farooq Mann, on 3/2/2020.

## (undated) NOTE — LETTER
05/19/20        [place] [address]      Dear Bella Chance,    [address] records indicate that you have outstanding lab work and or testing that was ordered for you and has not yet been completed:  Orders Placed This Encounter      XR DE